# Patient Record
Sex: MALE | Race: WHITE | NOT HISPANIC OR LATINO
[De-identification: names, ages, dates, MRNs, and addresses within clinical notes are randomized per-mention and may not be internally consistent; named-entity substitution may affect disease eponyms.]

---

## 2017-10-07 ENCOUNTER — APPOINTMENT (OUTPATIENT)
Dept: FAMILY MEDICINE | Facility: CLINIC | Age: 77
End: 2017-10-07
Payer: MEDICARE

## 2017-10-07 VITALS
SYSTOLIC BLOOD PRESSURE: 120 MMHG | WEIGHT: 161 LBS | RESPIRATION RATE: 12 BRPM | HEART RATE: 64 BPM | OXYGEN SATURATION: 98 % | HEIGHT: 65 IN | BODY MASS INDEX: 26.82 KG/M2 | DIASTOLIC BLOOD PRESSURE: 80 MMHG

## 2017-10-07 VITALS — TEMPERATURE: 98.8 F

## 2017-10-07 DIAGNOSIS — Z80.0 FAMILY HISTORY OF MALIGNANT NEOPLASM OF DIGESTIVE ORGANS: ICD-10-CM

## 2017-10-07 DIAGNOSIS — Z82.49 FAMILY HISTORY OF ISCHEMIC HEART DISEASE AND OTHER DISEASES OF THE CIRCULATORY SYSTEM: ICD-10-CM

## 2017-10-07 DIAGNOSIS — Z78.9 OTHER SPECIFIED HEALTH STATUS: ICD-10-CM

## 2017-10-07 DIAGNOSIS — Z85.01 PERSONAL HISTORY OF MALIGNANT NEOPLASM OF ESOPHAGUS: ICD-10-CM

## 2017-10-07 PROCEDURE — 99213 OFFICE O/P EST LOW 20 MIN: CPT

## 2017-10-10 ENCOUNTER — RX RENEWAL (OUTPATIENT)
Age: 77
End: 2017-10-10

## 2018-04-05 ENCOUNTER — APPOINTMENT (OUTPATIENT)
Dept: FAMILY MEDICINE | Facility: CLINIC | Age: 78
End: 2018-04-05
Payer: MEDICARE

## 2018-04-05 ENCOUNTER — NON-APPOINTMENT (OUTPATIENT)
Age: 78
End: 2018-04-05

## 2018-04-05 VITALS
RESPIRATION RATE: 12 BRPM | OXYGEN SATURATION: 97 % | SYSTOLIC BLOOD PRESSURE: 120 MMHG | HEART RATE: 70 BPM | BODY MASS INDEX: 27.32 KG/M2 | DIASTOLIC BLOOD PRESSURE: 70 MMHG | WEIGHT: 164 LBS | HEIGHT: 65 IN

## 2018-04-05 PROCEDURE — 90670 PCV13 VACCINE IM: CPT

## 2018-04-05 PROCEDURE — 93000 ELECTROCARDIOGRAM COMPLETE: CPT

## 2018-04-05 PROCEDURE — G0009: CPT

## 2018-04-05 PROCEDURE — G0439: CPT

## 2018-04-05 RX ORDER — BENZONATATE 100 MG/1
100 CAPSULE ORAL 3 TIMES DAILY
Qty: 75 | Refills: 2 | Status: DISCONTINUED | COMMUNITY
Start: 2017-10-10 | End: 2018-04-05

## 2018-04-06 LAB
25(OH)D3 SERPL-MCNC: 22.4 NG/ML
ALBUMIN SERPL ELPH-MCNC: 4.2 G/DL
ALP BLD-CCNC: 111 U/L
ALT SERPL-CCNC: 14 U/L
ANION GAP SERPL CALC-SCNC: 12 MMOL/L
AST SERPL-CCNC: 26 U/L
BASOPHILS # BLD AUTO: 0.03 K/UL
BASOPHILS NFR BLD AUTO: 0.6 %
BILIRUB SERPL-MCNC: 0.3 MG/DL
BUN SERPL-MCNC: 23 MG/DL
CALCIUM SERPL-MCNC: 9.6 MG/DL
CHLORIDE SERPL-SCNC: 105 MMOL/L
CHOLEST SERPL-MCNC: 198 MG/DL
CHOLEST/HDLC SERPL: 3.3 RATIO
CO2 SERPL-SCNC: 25 MMOL/L
CREAT SERPL-MCNC: 1.32 MG/DL
EOSINOPHIL # BLD AUTO: 0.16 K/UL
EOSINOPHIL NFR BLD AUTO: 3.4 %
GLUCOSE SERPL-MCNC: 99 MG/DL
HBA1C MFR BLD HPLC: 6 %
HCT VFR BLD CALC: 37.7 %
HDLC SERPL-MCNC: 60 MG/DL
HGB BLD-MCNC: 12.1 G/DL
IMM GRANULOCYTES NFR BLD AUTO: 0.2 %
IRON SERPL-MCNC: 51 UG/DL
LDLC SERPL CALC-MCNC: 119 MG/DL
LYMPHOCYTES # BLD AUTO: 0.86 K/UL
LYMPHOCYTES NFR BLD AUTO: 18.3 %
MAN DIFF?: NORMAL
MCHC RBC-ENTMCNC: 28.4 PG
MCHC RBC-ENTMCNC: 32.1 GM/DL
MCV RBC AUTO: 88.5 FL
MONOCYTES # BLD AUTO: 0.51 K/UL
MONOCYTES NFR BLD AUTO: 10.8 %
NEUTROPHILS # BLD AUTO: 3.14 K/UL
NEUTROPHILS NFR BLD AUTO: 66.7 %
PLATELET # BLD AUTO: 210 K/UL
POTASSIUM SERPL-SCNC: 4.9 MMOL/L
PROT SERPL-MCNC: 7.7 G/DL
RBC # BLD: 4.26 M/UL
RBC # FLD: 13.6 %
SODIUM SERPL-SCNC: 142 MMOL/L
T3 SERPL-MCNC: 103 NG/DL
T3FREE SERPL-MCNC: 2.7 PG/ML
T4 FREE SERPL-MCNC: 1.1 NG/DL
T4 SERPL-MCNC: 6.2 UG/DL
TRIGL SERPL-MCNC: 95 MG/DL
TSH SERPL-ACNC: 1.88 UIU/ML
VIT B12 SERPL-MCNC: 370 PG/ML
WBC # FLD AUTO: 4.71 K/UL

## 2018-04-26 DIAGNOSIS — E55.9 VITAMIN D DEFICIENCY, UNSPECIFIED: ICD-10-CM

## 2018-04-26 DIAGNOSIS — R53.83 OTHER FATIGUE: ICD-10-CM

## 2018-05-07 ENCOUNTER — APPOINTMENT (OUTPATIENT)
Dept: FAMILY MEDICINE | Facility: CLINIC | Age: 78
End: 2018-05-07
Payer: MEDICARE

## 2018-05-07 VITALS
OXYGEN SATURATION: 97 % | BODY MASS INDEX: 27.32 KG/M2 | DIASTOLIC BLOOD PRESSURE: 64 MMHG | RESPIRATION RATE: 14 BRPM | HEART RATE: 71 BPM | WEIGHT: 164 LBS | HEIGHT: 65 IN | SYSTOLIC BLOOD PRESSURE: 106 MMHG

## 2018-05-07 PROCEDURE — 99213 OFFICE O/P EST LOW 20 MIN: CPT

## 2018-05-10 ENCOUNTER — RX RENEWAL (OUTPATIENT)
Age: 78
End: 2018-05-10

## 2018-07-23 PROBLEM — Z80.0 FAMILY HISTORY OF COLON CANCER: Status: ACTIVE | Noted: 2017-10-07

## 2018-08-03 ENCOUNTER — APPOINTMENT (OUTPATIENT)
Dept: FAMILY MEDICINE | Facility: CLINIC | Age: 78
End: 2018-08-03

## 2018-08-10 ENCOUNTER — RX RENEWAL (OUTPATIENT)
Age: 78
End: 2018-08-10

## 2018-08-10 RX ORDER — BLOOD SUGAR DIAGNOSTIC
STRIP MISCELLANEOUS
Qty: 1 | Refills: 3 | Status: ACTIVE | COMMUNITY
Start: 2018-08-10 | End: 1900-01-01

## 2018-08-29 ENCOUNTER — APPOINTMENT (OUTPATIENT)
Dept: FAMILY MEDICINE | Facility: CLINIC | Age: 78
End: 2018-08-29
Payer: MEDICARE

## 2018-08-29 VITALS
BODY MASS INDEX: 27.32 KG/M2 | WEIGHT: 164 LBS | OXYGEN SATURATION: 97 % | HEART RATE: 82 BPM | RESPIRATION RATE: 14 BRPM | DIASTOLIC BLOOD PRESSURE: 64 MMHG | HEIGHT: 65 IN | SYSTOLIC BLOOD PRESSURE: 114 MMHG

## 2018-08-29 DIAGNOSIS — R97.20 ELEVATED PROSTATE, SPECIFIC ANTIGEN [PSA]: ICD-10-CM

## 2018-08-29 PROCEDURE — 99213 OFFICE O/P EST LOW 20 MIN: CPT

## 2018-08-29 NOTE — PHYSICAL EXAM
[No Acute Distress] : no acute distress [Well Nourished] : well nourished [Well Developed] : well developed [Well-Appearing] : well-appearing [No JVD] : no jugular venous distention [Supple] : supple [No Lymphadenopathy] : no lymphadenopathy [No Respiratory Distress] : no respiratory distress  [Clear to Auscultation] : lungs were clear to auscultation bilaterally [Normal Rate] : normal rate  [Regular Rhythm] : with a regular rhythm [No Murmur] : no murmur heard [No Carotid Bruits] : no carotid bruits [Normal Posterior Cervical Nodes] : no posterior cervical lymphadenopathy [Normal Anterior Cervical Nodes] : no anterior cervical lymphadenopathy [No CVA Tenderness] : no CVA  tenderness [No Spinal Tenderness] : no spinal tenderness

## 2018-08-29 NOTE — HISTORY OF PRESENT ILLNESS
[FreeTextEntry8] : 3 month check  Doing well with GERD. Due for check on hyperlipidemia and borderline diabetes.

## 2018-08-29 NOTE — ASSESSMENT
[FreeTextEntry1] : 1. Pt doing well on treatment as long as he behaves himself with his diet/wine. 2. Diet reviewed and due for lab work. Reviewed PSA's from urology with him - at present observe. 4.  Lab check on tick borne diseases

## 2018-09-10 LAB — HBA1C MFR BLD HPLC: 6.3

## 2018-10-02 ENCOUNTER — APPOINTMENT (OUTPATIENT)
Dept: FAMILY MEDICINE | Facility: CLINIC | Age: 78
End: 2018-10-02

## 2018-12-18 ENCOUNTER — RX RENEWAL (OUTPATIENT)
Age: 78
End: 2018-12-18

## 2019-05-02 ENCOUNTER — APPOINTMENT (OUTPATIENT)
Dept: FAMILY MEDICINE | Facility: CLINIC | Age: 79
End: 2019-05-02
Payer: MEDICARE

## 2019-05-02 VITALS
RESPIRATION RATE: 14 BRPM | HEIGHT: 65 IN | BODY MASS INDEX: 26.82 KG/M2 | WEIGHT: 161 LBS | OXYGEN SATURATION: 96 % | SYSTOLIC BLOOD PRESSURE: 118 MMHG | DIASTOLIC BLOOD PRESSURE: 80 MMHG | HEART RATE: 65 BPM

## 2019-05-02 DIAGNOSIS — Z12.11 ENCOUNTER FOR SCREENING FOR MALIGNANT NEOPLASM OF COLON: ICD-10-CM

## 2019-05-02 DIAGNOSIS — Z13.1 ENCOUNTER FOR SCREENING FOR DIABETES MELLITUS: ICD-10-CM

## 2019-05-02 DIAGNOSIS — Z13.21 ENCOUNTER FOR SCREENING FOR NUTRITIONAL DISORDER: ICD-10-CM

## 2019-05-02 DIAGNOSIS — Z13.29 ENCOUNTER FOR SCREENING FOR OTHER SUSPECTED ENDOCRINE DISORDER: ICD-10-CM

## 2019-05-02 DIAGNOSIS — Z13.220 ENCOUNTER FOR SCREENING FOR LIPOID DISORDERS: ICD-10-CM

## 2019-05-02 PROCEDURE — 36415 COLL VENOUS BLD VENIPUNCTURE: CPT

## 2019-05-02 PROCEDURE — G0439: CPT

## 2019-05-02 PROCEDURE — 90732 PPSV23 VACC 2 YRS+ SUBQ/IM: CPT

## 2019-05-02 PROCEDURE — G0009: CPT

## 2019-05-02 RX ORDER — DOXYCYCLINE HYCLATE 100 MG/1
100 CAPSULE ORAL
Qty: 10 | Refills: 0 | Status: DISCONTINUED | COMMUNITY
Start: 2018-05-07 | End: 2019-05-02

## 2019-05-02 NOTE — ASSESSMENT
[FreeTextEntry1] : Diet/exercise reviewed.  Fall precautions reviewed.  FOB kit given.  Pneumovax 23 given.  Lab done here today. Meds were reviewed.

## 2019-05-02 NOTE — PHYSICAL EXAM
[No Acute Distress] : no acute distress [Well Nourished] : well nourished [Well Developed] : well developed [Well-Appearing] : well-appearing [PERRL] : pupils equal round and reactive to light [Normal Sclera/Conjunctiva] : normal sclera/conjunctiva [Normal Outer Ear/Nose] : the outer ears and nose were normal in appearance [EOMI] : extraocular movements intact [No JVD] : no jugular venous distention [Normal Oropharynx] : the oropharynx was normal [Supple] : supple [No Respiratory Distress] : no respiratory distress  [Thyroid Normal, No Nodules] : the thyroid was normal and there were no nodules present [No Lymphadenopathy] : no lymphadenopathy [Clear to Auscultation] : lungs were clear to auscultation bilaterally [Normal Rate] : normal rate  [Regular Rhythm] : with a regular rhythm [No Murmur] : no murmur heard [Normal S1, S2] : normal S1 and S2 [No Carotid Bruits] : no carotid bruits [No Abdominal Bruit] : a ~M bruit was not heard ~T in the abdomen [No Varicosities] : no varicosities [Pedal Pulses Present] : the pedal pulses are present [No Extremity Clubbing/Cyanosis] : no extremity clubbing/cyanosis [No Edema] : there was no peripheral edema [No Palpable Aorta] : no palpable aorta [Soft] : abdomen soft [Non Tender] : non-tender [Non-distended] : non-distended [No Masses] : no abdominal mass palpated [No HSM] : no HSM [Normal Bowel Sounds] : normal bowel sounds [Normal Posterior Cervical Nodes] : no posterior cervical lymphadenopathy [Normal Anterior Cervical Nodes] : no anterior cervical lymphadenopathy [No CVA Tenderness] : no CVA  tenderness [No Spinal Tenderness] : no spinal tenderness [No Joint Swelling] : no joint swelling [Grossly Normal Strength/Tone] : grossly normal strength/tone [No Rash] : no rash [Normal Gait] : normal gait [Coordination Grossly Intact] : coordination grossly intact [No Focal Deficits] : no focal deficits [Deep Tendon Reflexes (DTR)] : deep tendon reflexes were 2+ and symmetric [Speech Grossly Normal] : speech grossly normal [Memory Grossly Normal] : memory grossly normal [Normal Affect] : the affect was normal [Alert and Oriented x3] : oriented to person, place, and time [Normal Mood] : the mood was normal [Normal Insight/Judgement] : insight and judgment were intact

## 2019-05-02 NOTE — HEALTH RISK ASSESSMENT
[Very Good] : ~his/her~  mood as very good [No falls in past year] : Patient reported no falls in the past year [0] : 1) Little interest or pleasure doing things: Not at all (0) [Hepatitis C test declined] : Hepatitis C test declined [HIV test declined] : HIV test declined [Patient reported colonoscopy was normal] : Patient reported colonoscopy was normal [None] : None [With Significant Other] : lives with significant other [] :  [Employed] : employed [Feels Safe at Home] : Feels safe at home [# Of Children ___] : has [unfilled] children [Fully functional (bathing, dressing, toileting, transferring, walking, feeding)] : Fully functional (bathing, dressing, toileting, transferring, walking, feeding) [Reports changes in hearing] : Reports changes in hearing [Fully functional (using the telephone, shopping, preparing meals, housekeeping, doing laundry, using] : Fully functional and needs no help or supervision to perform IADLs (using the telephone, shopping, preparing meals, housekeeping, doing laundry, using transportation, managing medications and managing finances) [Smoke Detector] : smoke detector [Reports changes in vision] : Reports changes in vision [Carbon Monoxide Detector] : carbon monoxide detector [Sunscreen] : uses sunscreen [Seat Belt] :  uses seat belt [Safety elements used in home] : safety elements used in home [Designated Healthcare Proxy] : Designated healthcare proxy [Relationship: ___] : Relationship: [unfilled] [Name: ___] : Health Care Proxy's Name: [unfilled]  [DNR] : DNR [] : No [de-identified] : social use [de-identified] : no exercise, does a physical job  twice a week [de-identified] : low salt [[id number]] : 0 [Change in mental status noted] : No change in mental status noted [Language] : denies difficulty with language [Behavior] : denies difficulty with behavior [Learning/Retaining New Information] : denies difficulty learning/retaining new information [Reasoning] : denies difficulty with reasoning [Handling Complex Tasks] : denies difficulty handling complex tasks [Guns at Home] : no guns at home [Reports changes in dental health] : Reports no changes in dental health [Travel to Developing Areas] : does not  travel to developing areas [TB Exposure] : is not being exposed to tuberculosis [ColonoscopyDate] : 2016 [ColonoscopyComments] : 3-5 years  Dr Dougherty [AdvancecareDate] : 5/2/2019

## 2019-05-04 LAB
25(OH)D3 SERPL-MCNC: 27 NG/ML
ALBUMIN SERPL ELPH-MCNC: 4.3 G/DL
ALP BLD-CCNC: 94 U/L
ALT SERPL-CCNC: 8 U/L
ANION GAP SERPL CALC-SCNC: 11 MMOL/L
AST SERPL-CCNC: 18 U/L
BASOPHILS # BLD AUTO: 0.03 K/UL
BASOPHILS NFR BLD AUTO: 0.7 %
BILIRUB SERPL-MCNC: 0.5 MG/DL
BUN SERPL-MCNC: 21 MG/DL
CALCIUM SERPL-MCNC: 9.9 MG/DL
CHLORIDE SERPL-SCNC: 104 MMOL/L
CHOLEST SERPL-MCNC: 188 MG/DL
CHOLEST/HDLC SERPL: 3.3 RATIO
CO2 SERPL-SCNC: 27 MMOL/L
CREAT SERPL-MCNC: 1.34 MG/DL
EOSINOPHIL # BLD AUTO: 0.04 K/UL
EOSINOPHIL NFR BLD AUTO: 1 %
ESTIMATED AVERAGE GLUCOSE: 134 MG/DL
FOLATE SERPL-MCNC: 13.7 NG/ML
GLUCOSE SERPL-MCNC: 103 MG/DL
HBA1C MFR BLD HPLC: 6.3 %
HCT VFR BLD CALC: 41 %
HDLC SERPL-MCNC: 57 MG/DL
HGB BLD-MCNC: 13 G/DL
IMM GRANULOCYTES NFR BLD AUTO: 0.5 %
LDLC SERPL CALC-MCNC: 114 MG/DL
LYMPHOCYTES # BLD AUTO: 0.62 K/UL
LYMPHOCYTES NFR BLD AUTO: 15.4 %
MAN DIFF?: NORMAL
MCHC RBC-ENTMCNC: 28.4 PG
MCHC RBC-ENTMCNC: 31.7 GM/DL
MCV RBC AUTO: 89.5 FL
MONOCYTES # BLD AUTO: 0.42 K/UL
MONOCYTES NFR BLD AUTO: 10.4 %
NEUTROPHILS # BLD AUTO: 2.9 K/UL
NEUTROPHILS NFR BLD AUTO: 72 %
PLATELET # BLD AUTO: 168 K/UL
POTASSIUM SERPL-SCNC: 4.7 MMOL/L
PROT SERPL-MCNC: 6.8 G/DL
RBC # BLD: 4.58 M/UL
RBC # FLD: 13.2 %
SODIUM SERPL-SCNC: 142 MMOL/L
T3FREE SERPL-MCNC: 2.62 PG/ML
T4 FREE SERPL-MCNC: 1.1 NG/DL
TRIGL SERPL-MCNC: 86 MG/DL
TSH SERPL-ACNC: 2.3 UIU/ML
VIT B12 SERPL-MCNC: 345 PG/ML
WBC # FLD AUTO: 4.03 K/UL

## 2019-05-16 ENCOUNTER — APPOINTMENT (OUTPATIENT)
Dept: FAMILY MEDICINE | Facility: CLINIC | Age: 79
End: 2019-05-16
Payer: MEDICARE

## 2019-05-16 VITALS
DIASTOLIC BLOOD PRESSURE: 70 MMHG | RESPIRATION RATE: 14 BRPM | TEMPERATURE: 101.3 F | BODY MASS INDEX: 26.82 KG/M2 | HEART RATE: 92 BPM | OXYGEN SATURATION: 96 % | WEIGHT: 161 LBS | HEIGHT: 65 IN | SYSTOLIC BLOOD PRESSURE: 120 MMHG

## 2019-05-16 DIAGNOSIS — R50.9 FEVER, UNSPECIFIED: ICD-10-CM

## 2019-05-16 PROCEDURE — 99213 OFFICE O/P EST LOW 20 MIN: CPT

## 2019-05-16 NOTE — REVIEW OF SYSTEMS
[Fever] : fever [Postnasal Drip] : postnasal drip [Cough] : cough [Nasal Discharge] : nasal discharge [Negative] : Heme/Lymph [FreeTextEntry4] : sinus pressure

## 2019-05-16 NOTE — ASSESSMENT
[FreeTextEntry1] : Rest, fluids, flonase, vitamin C, benzonate prn cough\par     - check CXR rule out pneumonia \par     - patient instructed to RTO if symptoms worsen or persist, if fevers develop, does not get better in 7 days.\par    - start doxy, take with food avoid taking with dairy/iron products.

## 2019-05-16 NOTE — HISTORY OF PRESENT ILLNESS
[___ Days ago] : [unfilled] days ago [FreeTextEntry8] : Pt reports +temp, +congestion, +cough with green phlegm +sinus pressure, +RN/PND x 5 days. Drinking lots of water and taking Tylenol for fever. Denies shortness of breath, headache, ear pain, sore throat. States wife is sick with a URI.

## 2019-05-16 NOTE — PHYSICAL EXAM
[No Acute Distress] : no acute distress [Well Nourished] : well nourished [Well Developed] : well developed [Ill-Appearing] : ill-appearing [Normal Sclera/Conjunctiva] : normal sclera/conjunctiva [Normal Outer Ear/Nose] : the outer ears and nose were normal in appearance [No Lymphadenopathy] : no lymphadenopathy [Supple] : supple [No Respiratory Distress] : no respiratory distress  [Normal Rate] : normal rate  [No Accessory Muscle Use] : no accessory muscle use [Normal S1, S2] : normal S1 and S2 [Regular Rhythm] : with a regular rhythm [Normal Affect] : the affect was normal [No Murmur] : no murmur heard [Alert and Oriented x3] : oriented to person, place, and time [Normal Insight/Judgement] : insight and judgment were intact [de-identified] : bilateral nasal turbinates and posterior oropharynx erythematous, moderate clear post nasal drip, +ethmoid and sinsu pressure, bilateral TM serous effusion.  [de-identified] : ELIJAH rhonchi heard on auscultation, breath sounds otherwise clear

## 2019-05-28 ENCOUNTER — APPOINTMENT (OUTPATIENT)
Dept: FAMILY MEDICINE | Facility: CLINIC | Age: 79
End: 2019-05-28
Payer: MEDICARE

## 2019-05-28 VITALS
OXYGEN SATURATION: 97 % | HEART RATE: 80 BPM | RESPIRATION RATE: 14 BRPM | SYSTOLIC BLOOD PRESSURE: 124 MMHG | BODY MASS INDEX: 25.96 KG/M2 | DIASTOLIC BLOOD PRESSURE: 70 MMHG | WEIGHT: 156 LBS

## 2019-05-28 DIAGNOSIS — F41.8 OTHER SPECIFIED ANXIETY DISORDERS: ICD-10-CM

## 2019-05-28 PROCEDURE — 99213 OFFICE O/P EST LOW 20 MIN: CPT

## 2019-05-28 NOTE — ASSESSMENT
[FreeTextEntry1] : We discussed things that have been happening and what happened in the  past.  Rx of lorazepam 0.5 mg 1/2 to 1 tid prn. Use/precautions/safety of medication all reviewed.  [place]   referral to a counselor. He will call me with any concerns. \par

## 2019-05-28 NOTE — HISTORY OF PRESENT ILLNESS
[FreeTextEntry8] : pt c/o feeling off- anxious about several things.   Several friends , wife sick.  He will be going back to work on Monday.  " I really hate when I can't work".  I had this issue in the past and  ativan for a short period of time really helped.

## 2019-05-28 NOTE — PHYSICAL EXAM
[No Acute Distress] : no acute distress [Well Nourished] : well nourished [Well Developed] : well developed [Well-Appearing] : well-appearing [No JVD] : no jugular venous distention [No Lymphadenopathy] : no lymphadenopathy [Supple] : supple [No Respiratory Distress] : no respiratory distress  [Clear to Auscultation] : lungs were clear to auscultation bilaterally [Normal Rate] : normal rate  [Regular Rhythm] : with a regular rhythm [No Carotid Bruits] : no carotid bruits [Normal Posterior Cervical Nodes] : no posterior cervical lymphadenopathy [No Murmur] : no murmur heard [Speech Grossly Normal] : speech grossly normal [Normal Anterior Cervical Nodes] : no anterior cervical lymphadenopathy [Alert and Oriented x3] : oriented to person, place, and time [Memory Grossly Normal] : memory grossly normal [Normal Insight/Judgement] : insight and judgment were intact [de-identified] : flat affect

## 2019-06-11 DIAGNOSIS — R69 ILLNESS, UNSPECIFIED: ICD-10-CM

## 2019-06-14 ENCOUNTER — RX RENEWAL (OUTPATIENT)
Age: 79
End: 2019-06-14

## 2019-10-29 ENCOUNTER — RX CHANGE (OUTPATIENT)
Age: 79
End: 2019-10-29

## 2020-01-10 ENCOUNTER — APPOINTMENT (OUTPATIENT)
Dept: FAMILY MEDICINE | Facility: CLINIC | Age: 80
End: 2020-01-10
Payer: MEDICARE

## 2020-01-10 VITALS
WEIGHT: 162 LBS | BODY MASS INDEX: 26.99 KG/M2 | SYSTOLIC BLOOD PRESSURE: 122 MMHG | TEMPERATURE: 98.7 F | OXYGEN SATURATION: 98 % | HEART RATE: 72 BPM | HEIGHT: 65 IN | RESPIRATION RATE: 14 BRPM | DIASTOLIC BLOOD PRESSURE: 70 MMHG

## 2020-01-10 PROCEDURE — 90653 IIV ADJUVANT VACCINE IM: CPT

## 2020-01-10 PROCEDURE — G0008: CPT

## 2020-01-10 PROCEDURE — 36415 COLL VENOUS BLD VENIPUNCTURE: CPT

## 2020-01-10 PROCEDURE — 99213 OFFICE O/P EST LOW 20 MIN: CPT | Mod: 25

## 2020-01-10 RX ORDER — BENZONATATE 100 MG/1
100 CAPSULE ORAL 3 TIMES DAILY
Qty: 30 | Refills: 1 | Status: DISCONTINUED | COMMUNITY
Start: 2019-05-16 | End: 2020-01-10

## 2020-01-10 RX ORDER — DIAZEPAM 5 MG/1
5 TABLET ORAL
Qty: 1 | Refills: 0 | Status: DISCONTINUED | COMMUNITY
Start: 2018-03-14 | End: 2020-01-10

## 2020-01-10 RX ORDER — CEFDINIR 300 MG/1
300 CAPSULE ORAL
Qty: 14 | Refills: 0 | Status: DISCONTINUED | COMMUNITY
Start: 2017-10-07 | End: 2020-01-10

## 2020-01-10 NOTE — REVIEW OF SYSTEMS
[Joint Stiffness] : joint stiffness [Joint Pain] : joint pain [Heartburn] : heartburn [Anxiety] : anxiety [Negative] : Neurological [Depression] : no depression

## 2020-01-10 NOTE — HISTORY OF PRESENT ILLNESS
[FreeTextEntry8] : pt c/o +chills, +post nasal drip, +phlegm +cough X 1 week   Feeling better now  and needs lab and flu shot.

## 2020-01-10 NOTE — PHYSICAL EXAM
[No Carotid Bruits] : no carotid bruits [No Edema] : there was no peripheral edema [Normal Posterior Cervical Nodes] : no posterior cervical lymphadenopathy [Normal Anterior Cervical Nodes] : no anterior cervical lymphadenopathy [Normal] : affect was normal and insight and judgment were intact

## 2020-01-10 NOTE — ASSESSMENT
[FreeTextEntry1] : 1. GERD- we reviewed his use of  omeprazole and he needs to use it daily.  2.  Diet reviewed and  lab done to check status.  3.  Fluad given.

## 2020-01-13 LAB
ESTIMATED AVERAGE GLUCOSE: 137 MG/DL
HBA1C MFR BLD HPLC: 6.4 %
PSA SERPL-MCNC: 6.45
PSA SERPL-MCNC: 7.43 NG/ML
TSH SERPL-ACNC: 2.92 UIU/ML

## 2020-07-20 ENCOUNTER — APPOINTMENT (OUTPATIENT)
Dept: FAMILY MEDICINE | Facility: CLINIC | Age: 80
End: 2020-07-20
Payer: MEDICARE

## 2020-07-20 VITALS
DIASTOLIC BLOOD PRESSURE: 70 MMHG | RESPIRATION RATE: 14 BRPM | HEART RATE: 70 BPM | OXYGEN SATURATION: 97 % | WEIGHT: 157 LBS | BODY MASS INDEX: 26.16 KG/M2 | TEMPERATURE: 99.1 F | HEIGHT: 65 IN | SYSTOLIC BLOOD PRESSURE: 120 MMHG

## 2020-07-20 DIAGNOSIS — Z00.00 ENCOUNTER FOR GENERAL ADULT MEDICAL EXAMINATION W/OUT ABNORMAL FINDINGS: ICD-10-CM

## 2020-07-20 DIAGNOSIS — Z11.59 ENCOUNTER FOR SCREENING FOR OTHER VIRAL DISEASES: ICD-10-CM

## 2020-07-20 PROCEDURE — G0439: CPT

## 2020-07-20 PROCEDURE — 36415 COLL VENOUS BLD VENIPUNCTURE: CPT

## 2020-07-20 RX ORDER — LORAZEPAM 0.5 MG/1
0.5 TABLET ORAL
Qty: 75 | Refills: 0 | Status: DISCONTINUED | COMMUNITY
Start: 2019-05-28 | End: 2020-07-20

## 2020-07-20 NOTE — HEALTH RISK ASSESSMENT
[Very Good] : ~his/her~ current health as very good [Excellent] : ~his/her~  mood as  excellent [Yes] : Yes [Monthly or less (1 pt)] : Monthly or less (1 point) [1 or 2 (0 pts)] : 1 or 2 (0 points) [Never (0 pts)] : Never (0 points) [No] : In the past 12 months have you used drugs other than those required for medical reasons? No [No falls in past year] : Patient reported no falls in the past year [0] : 1) Little interest or pleasure doing things: Not at all (0) [HIV test declined] : HIV test declined [Hepatitis C test declined] : Hepatitis C test declined [None] : None [With Significant Other] : lives with significant other [] :  [High School] : high school [Retired] : retired [# Of Children ___] : has [unfilled] children [Feels Safe at Home] : Feels safe at home [Fully functional (bathing, dressing, toileting, transferring, walking, feeding)] : Fully functional (bathing, dressing, toileting, transferring, walking, feeding) [Fully functional (using the telephone, shopping, preparing meals, housekeeping, doing laundry, using] : Fully functional and needs no help or supervision to perform IADLs (using the telephone, shopping, preparing meals, housekeeping, doing laundry, using transportation, managing medications and managing finances) [Reports changes in hearing] : Reports changes in hearing [Reports normal functional visual acuity (ie: able to read med bottle)] : Reports normal functional visual acuity [Smoke Detector] : smoke detector [Carbon Monoxide Detector] : carbon monoxide detector [Safety elements used in home] : safety elements used in home [Seat Belt] :  uses seat belt [Sunscreen] : uses sunscreen [Designated Healthcare Proxy] : Designated healthcare proxy [Name: ___] : Health Care Proxy's Name: [unfilled]  [Relationship: ___] : Relationship: [unfilled] [Audit-CScore] : 1 [] : No [de-identified] : very active [de-identified] : no special diet [[id number]] : 0 [Change in mental status noted] : No change in mental status noted [Language] : denies difficulty with language [Behavior] : denies difficulty with behavior [Learning/Retaining New Information] : denies difficulty learning/retaining new information [Handling Complex Tasks] : denies difficulty handling complex tasks [Reasoning] : denies difficulty with reasoning [Spatial Ability and Orientation] : denies difficulty with spatial ability and orientation [Reports changes in vision] : Reports no changes in vision [Reports changes in dental health] : Reports no changes in dental health [Guns at Home] : no guns at home [Travel to Developing Areas] : does not  travel to developing areas [TB Exposure] : is not being exposed to tuberculosis [ColonoscopyDate] : 2016 [AdvancecareDate] : 7/20/2020

## 2020-07-20 NOTE — ASSESSMENT
[FreeTextEntry1] : Diet/ activity / medications/ fall and COVID19 precautions reviewed.   Meds are UTD   Lab done here today.

## 2020-07-20 NOTE — REVIEW OF SYSTEMS
[Heartburn] : heartburn [Joint Stiffness] : joint stiffness [Joint Pain] : joint pain [Anxiety] : no anxiety [Depression] : no depression [Negative] : Heme/Lymph

## 2020-07-21 LAB
ALBUMIN SERPL ELPH-MCNC: 4.6 G/DL
ALP BLD-CCNC: 95 U/L
ALT SERPL-CCNC: 9 U/L
ANION GAP SERPL CALC-SCNC: 13 MMOL/L
AST SERPL-CCNC: 21 U/L
BASOPHILS # BLD AUTO: 0.04 K/UL
BASOPHILS NFR BLD AUTO: 0.8 %
BILIRUB SERPL-MCNC: 0.4 MG/DL
BUN SERPL-MCNC: 23 MG/DL
CALCIUM SERPL-MCNC: 9.8 MG/DL
CHLORIDE SERPL-SCNC: 104 MMOL/L
CHOLEST SERPL-MCNC: 203 MG/DL
CHOLEST/HDLC SERPL: 3.6 RATIO
CK SERPL-CCNC: 109 U/L
CO2 SERPL-SCNC: 25 MMOL/L
CREAT SERPL-MCNC: 1.5 MG/DL
EOSINOPHIL # BLD AUTO: 0.07 K/UL
EOSINOPHIL NFR BLD AUTO: 1.5 %
ESTIMATED AVERAGE GLUCOSE: 128 MG/DL
GLUCOSE SERPL-MCNC: 109 MG/DL
HBA1C MFR BLD HPLC: 6.1 %
HCT VFR BLD CALC: 41.5 %
HDLC SERPL-MCNC: 57 MG/DL
HGB BLD-MCNC: 12.3 G/DL
IMM GRANULOCYTES NFR BLD AUTO: 0.2 %
LDLC SERPL CALC-MCNC: 121 MG/DL
LYMPHOCYTES # BLD AUTO: 0.76 K/UL
LYMPHOCYTES NFR BLD AUTO: 16.1 %
MAN DIFF?: NORMAL
MCHC RBC-ENTMCNC: 27 PG
MCHC RBC-ENTMCNC: 29.6 GM/DL
MCV RBC AUTO: 91.2 FL
MONOCYTES # BLD AUTO: 0.5 K/UL
MONOCYTES NFR BLD AUTO: 10.6 %
NEUTROPHILS # BLD AUTO: 3.33 K/UL
NEUTROPHILS NFR BLD AUTO: 70.8 %
PLATELET # BLD AUTO: 189 K/UL
POTASSIUM SERPL-SCNC: 4.7 MMOL/L
PROT SERPL-MCNC: 7.7 G/DL
PSA SERPL-MCNC: 7.62 NG/ML
RBC # BLD: 4.55 M/UL
RBC # FLD: 14.8 %
SARS-COV-2 IGG SERPL IA-ACNC: 0.13 INDEX
SARS-COV-2 IGG SERPL QL IA: NEGATIVE
SODIUM SERPL-SCNC: 142 MMOL/L
T3FREE SERPL-MCNC: 2.29 PG/ML
T4 FREE SERPL-MCNC: 1 NG/DL
TRIGL SERPL-MCNC: 128 MG/DL
TSH SERPL-ACNC: 2.45 UIU/ML
WBC # FLD AUTO: 4.71 K/UL

## 2020-09-28 ENCOUNTER — APPOINTMENT (OUTPATIENT)
Dept: FAMILY MEDICINE | Facility: CLINIC | Age: 80
End: 2020-09-28
Payer: MEDICARE

## 2020-09-28 PROCEDURE — 99441: CPT | Mod: 95

## 2020-09-28 RX ORDER — FLUTICASONE PROPIONATE 50 UG/1
50 SPRAY, METERED NASAL
Qty: 1 | Refills: 0 | Status: COMPLETED | COMMUNITY
Start: 2019-05-16 | End: 2020-09-28

## 2020-09-28 RX ORDER — DOXYCYCLINE HYCLATE 100 MG/1
100 CAPSULE ORAL
Qty: 14 | Refills: 0 | Status: COMPLETED | COMMUNITY
Start: 2019-05-16 | End: 2020-09-28

## 2020-09-28 NOTE — HISTORY OF PRESENT ILLNESS
[Home] : at home, [unfilled] , at the time of the visit. [Medical Office: ([facility])___] : at the medical office located in  [Verbal consent obtained from patient] : the patient, [unfilled] [FreeTextEntry1] : follow up  [de-identified] : Patient presents for follow up/ medication renewal. Patient requesting omeprazole. He reports occasional heartburn symptoms. He has not been eating as well due to the pandemic. States omeprazole is helpful to his symptoms. He has no other acute complaints.

## 2020-09-28 NOTE — PHYSICAL EXAM
[No Acute Distress] : no acute distress [Normal] : affect was normal and insight and judgment were intact

## 2020-11-05 ENCOUNTER — APPOINTMENT (OUTPATIENT)
Dept: FAMILY MEDICINE | Facility: CLINIC | Age: 80
End: 2020-11-05
Payer: MEDICARE

## 2020-11-05 VITALS — TEMPERATURE: 98.2 F

## 2020-11-05 PROCEDURE — 90662 IIV NO PRSV INCREASED AG IM: CPT

## 2020-11-05 PROCEDURE — G0008: CPT

## 2021-03-09 ENCOUNTER — APPOINTMENT (OUTPATIENT)
Dept: FAMILY MEDICINE | Facility: CLINIC | Age: 81
End: 2021-03-09
Payer: MEDICARE

## 2021-03-09 VITALS
SYSTOLIC BLOOD PRESSURE: 122 MMHG | HEART RATE: 87 BPM | RESPIRATION RATE: 14 BRPM | HEIGHT: 65 IN | WEIGHT: 165 LBS | DIASTOLIC BLOOD PRESSURE: 70 MMHG | OXYGEN SATURATION: 98 % | BODY MASS INDEX: 27.49 KG/M2 | TEMPERATURE: 97.2 F

## 2021-03-09 DIAGNOSIS — D64.9 ANEMIA, UNSPECIFIED: ICD-10-CM

## 2021-03-09 DIAGNOSIS — Z13.228 ENCOUNTER FOR SCREENING FOR OTHER METABOLIC DISORDERS: ICD-10-CM

## 2021-03-09 DIAGNOSIS — Z13.0 ENCOUNTER FOR SCREENING FOR DISEASES OF THE BLOOD AND BLOOD-FORMING ORGANS AND CERTAIN DISORDERS INVOLVING THE IMMUNE MECHANISM: ICD-10-CM

## 2021-03-09 DIAGNOSIS — N40.0 BENIGN PROSTATIC HYPERPLASIA WITHOUT LOWER URINARY TRACT SYMPMS: ICD-10-CM

## 2021-03-09 PROCEDURE — 99213 OFFICE O/P EST LOW 20 MIN: CPT

## 2021-03-09 NOTE — PLAN
[FreeTextEntry1] : Anxiety and Depression\par PHQ9 2 points\par istop Reference #: [id number] \par denies Si/HI\par we had extensive discussion around elder patients on benzo's for use for anxiety as monotherapy and that it is not evidence based. Patient was insistent that he be sent a long term supply of ativan as that is what has worked for him in the past. I advised him that I can send him a short course of it to use on PRN basis for acute episodes of anxiety but to better control his symptoms he needs long acting SSRI such as sertraline. I advised him that I would be sending him SSRI to start with goal of maintaining baseline control of anxiety and to help with mild depression. \par Patient agreed to my short course of ativan and said he will try the SSRI and follow up. \par \par Pre-DM\par reviewed jul 2020 showing a1c 6.1\par rechecking today \par \par BPH \par elevated PSA noted jul 2020\par patient says he is following with urology next week and declined psa to be done today as he said urology would be checking him. \par \par Anemia\par Noted Hb in cbc from jul 2020 at 12.3\par rechecking cbc today. \par \par will call with results. advised pt to f/u in 30 days or earlier if symptoms worsen or he has side effects. \par Patient agrees with plan, all further questions answered during encounter.\par

## 2021-03-09 NOTE — REVIEW OF SYSTEMS
[Negative] : Neurological [Suicidal] : not suicidal [Insomnia] : no insomnia [Anxiety] : no anxiety [Depression] : no depression

## 2021-03-09 NOTE — HISTORY OF PRESENT ILLNESS
[FreeTextEntry1] : pt presents to discuss starting Ativan again. States he has been having issues with anxiety and depression due to covid, losing his job and passing of best friends  [de-identified] : 79 yo male, hx of esophageal cancer s/p resection now in remission, presents today for medical refills. \raymundo Says he feels anxious lately. had to quit job because of pandemic, had friends that passed away last year.\par Also stressed with wife who is not doing well with her health and he is her primary care giver. \par Was previously treated with ativan that he says helped him with his symptoms. Helps with his thoughts that keep him up at night. \raymundo Says that he previously tried paxil that did not agree with him. \raymunod Denies any SI/HI. \par He also requests blood work to be done however he is not fasting.

## 2021-03-12 LAB
ALBUMIN SERPL ELPH-MCNC: 4.4 G/DL
ALP BLD-CCNC: 103 U/L
ALT SERPL-CCNC: 8 U/L
ANION GAP SERPL CALC-SCNC: 10 MMOL/L
AST SERPL-CCNC: 25 U/L
BASOPHILS # BLD AUTO: 0.04 K/UL
BASOPHILS NFR BLD AUTO: 0.8 %
BILIRUB SERPL-MCNC: 0.4 MG/DL
BUN SERPL-MCNC: 24 MG/DL
CALCIUM SERPL-MCNC: 9.6 MG/DL
CHLORIDE SERPL-SCNC: 104 MMOL/L
CO2 SERPL-SCNC: 26 MMOL/L
CREAT SERPL-MCNC: 1.52 MG/DL
EOSINOPHIL # BLD AUTO: 0.08 K/UL
EOSINOPHIL NFR BLD AUTO: 1.7 %
ESTIMATED AVERAGE GLUCOSE: 131 MG/DL
GLUCOSE SERPL-MCNC: 139 MG/DL
HBA1C MFR BLD HPLC: 6.2 %
HCT VFR BLD CALC: 37.4 %
HGB BLD-MCNC: 12.1 G/DL
IMM GRANULOCYTES NFR BLD AUTO: 0.2 %
LYMPHOCYTES # BLD AUTO: 0.72 K/UL
LYMPHOCYTES NFR BLD AUTO: 15.1 %
MAN DIFF?: NORMAL
MCHC RBC-ENTMCNC: 28.2 PG
MCHC RBC-ENTMCNC: 32.4 GM/DL
MCV RBC AUTO: 87.2 FL
MONOCYTES # BLD AUTO: 0.53 K/UL
MONOCYTES NFR BLD AUTO: 11.1 %
NEUTROPHILS # BLD AUTO: 3.4 K/UL
NEUTROPHILS NFR BLD AUTO: 71.1 %
PLATELET # BLD AUTO: 197 K/UL
POTASSIUM SERPL-SCNC: 4.1 MMOL/L
PROT SERPL-MCNC: 7.1 G/DL
RBC # BLD: 4.29 M/UL
RBC # FLD: 15.2 %
SODIUM SERPL-SCNC: 139 MMOL/L
WBC # FLD AUTO: 4.78 K/UL

## 2021-03-30 ENCOUNTER — TRANSCRIPTION ENCOUNTER (OUTPATIENT)
Age: 81
End: 2021-03-30

## 2021-03-31 ENCOUNTER — NON-APPOINTMENT (OUTPATIENT)
Age: 81
End: 2021-03-31

## 2021-04-02 ENCOUNTER — APPOINTMENT (OUTPATIENT)
Dept: FAMILY MEDICINE | Facility: CLINIC | Age: 81
End: 2021-04-02
Payer: MEDICARE

## 2021-04-02 VITALS
DIASTOLIC BLOOD PRESSURE: 80 MMHG | TEMPERATURE: 96.6 F | SYSTOLIC BLOOD PRESSURE: 160 MMHG | BODY MASS INDEX: 26.82 KG/M2 | WEIGHT: 161 LBS | OXYGEN SATURATION: 96 % | RESPIRATION RATE: 14 BRPM | HEIGHT: 65 IN | HEART RATE: 82 BPM

## 2021-04-02 VITALS — DIASTOLIC BLOOD PRESSURE: 72 MMHG | SYSTOLIC BLOOD PRESSURE: 130 MMHG

## 2021-04-02 DIAGNOSIS — G47.00 INSOMNIA, UNSPECIFIED: ICD-10-CM

## 2021-04-02 PROCEDURE — 99213 OFFICE O/P EST LOW 20 MIN: CPT

## 2021-04-02 RX ORDER — LORAZEPAM 0.5 MG/1
0.5 TABLET ORAL TWICE DAILY
Qty: 28 | Refills: 0 | Status: DISCONTINUED | COMMUNITY
Start: 2021-03-09 | End: 2021-04-02

## 2021-04-02 NOTE — HISTORY OF PRESENT ILLNESS
[FreeTextEntry1] : patient presents for med check  [de-identified] : 80 yo male, pmh of anxiety and depression who presents today for follow up. \par Has been on zoloft now. Denies any SI/HI. Does complain of difficulty sleeping. He purchased and took ZZquil to try and sleep but did not help. He saw Psych NP recently, started him on Klonopin but it did not help with sleep. He denies dizziness, sleep walking, falls. Is requesting something to help him sleep because he is feeling very stressed by being unable to sleep. \par \par No other complaints. \par \par \par

## 2021-04-02 NOTE — ASSESSMENT
[FreeTextEntry1] : Anxiety and Depression\par Patient was started on Klonopin by Psych NP\par I advised patient to provide me with the name and number of this provider to get record of his encounter so that we can figure out any co-management needs\par patient says he will call back with provider info\par is tolerating zoloft 50mg daily\par will c/w at this time\par PHQ9 mild depression\par Denies SI/Hi\par checking thyroid studies today, TSH and FT4\par \par Insomnia \par will treat with trazodone 50mg qhs \par will also help serve as adjunct to SSRI treatment for depression\par I counseled patient on potential side effects, drowsiness, nausea, vomiting, GI upset and other symptoms of potential serotonin sickness and advised him to stop meds right away if he experiences these and to call me. \par patient verbalized to me that he will do so. \par \par will call with results. F/u 1 month \par Patient agrees with plan, all further questions answered during encounter.\par

## 2021-04-02 NOTE — PHYSICAL EXAM
[No Lymphadenopathy] : no lymphadenopathy [Thyroid Normal, No Nodules] : the thyroid was normal and there were no nodules present [Supple] : supple [Normal] : normal rate, regular rhythm, normal S1 and S2 and no murmur heard [No Focal Deficits] : no focal deficits [Coordination Grossly Intact] : coordination grossly intact [Normal Gait] : normal gait [Normal Affect] : the affect was normal [Normal Insight/Judgement] : insight and judgment were intact

## 2021-04-06 ENCOUNTER — NON-APPOINTMENT (OUTPATIENT)
Age: 81
End: 2021-04-06

## 2021-04-06 LAB
T4 FREE SERPL-MCNC: 1.2 NG/DL
TSH SERPL-ACNC: 1.68 UIU/ML

## 2021-04-21 ENCOUNTER — NON-APPOINTMENT (OUTPATIENT)
Age: 81
End: 2021-04-21

## 2021-06-01 ENCOUNTER — APPOINTMENT (OUTPATIENT)
Dept: FAMILY MEDICINE | Facility: CLINIC | Age: 81
End: 2021-06-01
Payer: MEDICARE

## 2021-06-01 VITALS
BODY MASS INDEX: 27.99 KG/M2 | DIASTOLIC BLOOD PRESSURE: 74 MMHG | RESPIRATION RATE: 14 BRPM | SYSTOLIC BLOOD PRESSURE: 118 MMHG | WEIGHT: 168 LBS | OXYGEN SATURATION: 98 % | HEIGHT: 65 IN | TEMPERATURE: 97.7 F | HEART RATE: 79 BPM

## 2021-06-01 PROCEDURE — 99213 OFFICE O/P EST LOW 20 MIN: CPT

## 2021-06-01 RX ORDER — TRAZODONE HYDROCHLORIDE 50 MG/1
50 TABLET ORAL
Qty: 30 | Refills: 0 | Status: DISCONTINUED | COMMUNITY
Start: 2021-04-02 | End: 2021-06-01

## 2021-06-01 RX ORDER — QUETIAPINE FUMARATE 50 MG/1
50 TABLET ORAL
Qty: 60 | Refills: 0 | Status: DISCONTINUED | COMMUNITY
Start: 2021-05-14 | End: 2021-06-01

## 2021-06-01 RX ORDER — PAROXETINE HYDROCHLORIDE 10 MG/1
10 TABLET, FILM COATED ORAL
Qty: 15 | Refills: 0 | Status: DISCONTINUED | COMMUNITY
Start: 2021-05-03 | End: 2021-06-01

## 2021-06-01 RX ORDER — HYDROXYZINE PAMOATE 25 MG/1
25 CAPSULE ORAL
Qty: 7 | Refills: 0 | Status: DISCONTINUED | COMMUNITY
Start: 2021-04-03 | End: 2021-06-01

## 2021-06-01 RX ORDER — CLONAZEPAM 0.5 MG/1
0.5 TABLET ORAL DAILY
Refills: 0 | Status: DISCONTINUED | COMMUNITY
End: 2021-06-01

## 2021-06-01 RX ORDER — SERTRALINE 25 MG/1
25 TABLET, FILM COATED ORAL
Qty: 60 | Refills: 1 | Status: DISCONTINUED | COMMUNITY
Start: 2021-03-09 | End: 2021-06-01

## 2021-06-01 NOTE — REVIEW OF SYSTEMS
[Joint Pain] : joint pain [Joint Swelling] : joint swelling [Negative] : Gastrointestinal [Joint Stiffness] : no joint stiffness [Muscle Pain] : no muscle pain [Muscle Weakness] : no muscle weakness [Back Pain] : no back pain [Suicidal] : not suicidal [Insomnia] : no insomnia [Anxiety] : no anxiety [Depression] : no depression [FreeTextEntry9] : right ankle pain.

## 2021-06-01 NOTE — ASSESSMENT
[FreeTextEntry1] : Acute Right Ankle Pain\par xray right ankle ordered\par PT script for evaluation given \par suspect sprain however patient with bony point tenderness\par ibuprofen prn for pain relief\par if not improved, return for re-evaluation\par \par Depression with Anxiety\par stable\par on fluxox, seroquel\par following outpatient psych\par denies SI/HI\par reviewed hospital dc note from inpatient psych at [facility]\par \par Pre-Diabetes\par checking a1c, cmp, cbc today\par \par will call with results.F/u 3 months or sooner prn \par Patient agrees with plan, all further questions answered during encounter.\par

## 2021-06-01 NOTE — HISTORY OF PRESENT ILLNESS
[FreeTextEntry1] : pt presents to discuss getting blood work done to check A1C.\par also c/o discomfort right ankle, +swelling, -numbness/tingling, +tender to touch, +limited ROM, x 2 weeks  [de-identified] : 82 yo male, pmh of depression, anxiety, delusional thoughts, preDM presents today for follow up. \par \par Was admitted to inpatient psych for depression with intrusive thoughts, admitted for 2 weeks. \par Was DC'd, followed outpatient psych, Dr. Tee doing well on luvox and seroquel.\par Due to follow up again in a few weeks. Does not need refills at this time. Denies any SI/HI.  \par \par 2 weeks of right ankle pain. \par No trauma to ankle. \par painful on medial side of right foot. 3/10 severity. \par Tried ibuprofen that did not really help him. \par No difficulty with gait or weight bearing. \par No prior episodes like this in past. \par \par Also requests updated blood work. \par No other complaints today.

## 2021-06-01 NOTE — PHYSICAL EXAM
[Normal] : normal rate, regular rhythm, normal S1 and S2 and no murmur heard [Pedal Pulses Present] : the pedal pulses are present [No Edema] : there was no peripheral edema [No Joint Swelling] : no joint swelling [Grossly Normal Strength/Tone] : grossly normal strength/tone [Coordination Grossly Intact] : coordination grossly intact [No Focal Deficits] : no focal deficits [Normal Gait] : normal gait [Speech Grossly Normal] : speech grossly normal [Normal Affect] : the affect was normal [Normal Mood] : the mood was normal [Normal Insight/Judgement] : insight and judgment were intact [de-identified] : tenderness over superior aspect or right medial malleolus. normal ROM. nontender when dorsi and plantarflexing feet.

## 2021-06-04 LAB
ALBUMIN SERPL ELPH-MCNC: 4.3 G/DL
ALP BLD-CCNC: 118 U/L
ALT SERPL-CCNC: 10 U/L
ANION GAP SERPL CALC-SCNC: 11 MMOL/L
AST SERPL-CCNC: 20 U/L
BASOPHILS # BLD AUTO: 0.06 K/UL
BASOPHILS NFR BLD AUTO: 1 %
BILIRUB SERPL-MCNC: 0.2 MG/DL
BUN SERPL-MCNC: 19 MG/DL
CALCIUM SERPL-MCNC: 9.1 MG/DL
CHLORIDE SERPL-SCNC: 106 MMOL/L
CO2 SERPL-SCNC: 25 MMOL/L
CREAT SERPL-MCNC: 1.64 MG/DL
EOSINOPHIL # BLD AUTO: 0.12 K/UL
EOSINOPHIL NFR BLD AUTO: 2 %
ESTIMATED AVERAGE GLUCOSE: 134 MG/DL
GLUCOSE SERPL-MCNC: 95 MG/DL
HBA1C MFR BLD HPLC: 6.3 %
HCT VFR BLD CALC: 35.3 %
HGB BLD-MCNC: 10.7 G/DL
IMM GRANULOCYTES NFR BLD AUTO: 0.7 %
LYMPHOCYTES # BLD AUTO: 1 K/UL
LYMPHOCYTES NFR BLD AUTO: 16.7 %
MAN DIFF?: NORMAL
MCHC RBC-ENTMCNC: 26.6 PG
MCHC RBC-ENTMCNC: 30.3 GM/DL
MCV RBC AUTO: 87.8 FL
MONOCYTES # BLD AUTO: 0.71 K/UL
MONOCYTES NFR BLD AUTO: 11.9 %
NEUTROPHILS # BLD AUTO: 4.06 K/UL
NEUTROPHILS NFR BLD AUTO: 67.7 %
PLATELET # BLD AUTO: 208 K/UL
POTASSIUM SERPL-SCNC: 4.7 MMOL/L
PROT SERPL-MCNC: 7.1 G/DL
RBC # BLD: 4.02 M/UL
RBC # FLD: 14.9 %
SODIUM SERPL-SCNC: 143 MMOL/L
WBC # FLD AUTO: 5.99 K/UL

## 2021-06-11 ENCOUNTER — APPOINTMENT (OUTPATIENT)
Dept: FAMILY MEDICINE | Facility: CLINIC | Age: 81
End: 2021-06-11

## 2021-08-24 ENCOUNTER — APPOINTMENT (OUTPATIENT)
Dept: FAMILY MEDICINE | Facility: CLINIC | Age: 81
End: 2021-08-24
Payer: MEDICARE

## 2021-08-24 VITALS
SYSTOLIC BLOOD PRESSURE: 120 MMHG | BODY MASS INDEX: 27.99 KG/M2 | WEIGHT: 168 LBS | RESPIRATION RATE: 14 BRPM | HEART RATE: 81 BPM | OXYGEN SATURATION: 98 % | DIASTOLIC BLOOD PRESSURE: 70 MMHG | HEIGHT: 65 IN

## 2021-08-24 DIAGNOSIS — F41.8 OTHER SPECIFIED ANXIETY DISORDERS: ICD-10-CM

## 2021-08-24 PROCEDURE — 36415 COLL VENOUS BLD VENIPUNCTURE: CPT

## 2021-08-24 PROCEDURE — 99214 OFFICE O/P EST MOD 30 MIN: CPT | Mod: 25

## 2021-08-24 RX ORDER — QUETIAPINE FUMARATE 50 MG/1
50 TABLET ORAL
Refills: 0 | Status: ACTIVE | COMMUNITY
Start: 2021-05-03

## 2021-08-24 NOTE — ASSESSMENT
[FreeTextEntry1] : Paroxysmal Afib\par cardiology consult noted from aug 2021\par not on AC due to no recurrences of afib noted by cardio\par consideration for stress test and patient is pending a decision if he wishes to get done. \par currently normal rate, not in afib on ausculation and radial pulse palpation. \par discussed signs and symptoms with patient and if he experiences persistent episodes of palpitations, he should be seen. \par \par Depression with Anxiety \par chronic, stable\par denies SI/HI\par follows with [facility] \par dose of seroquel decrease to 50mg qhs \par c/w with SSRi\par checking tsh/t4 and cmp to evaluate electrolytes on ssri. \par \par Prediabetes, chronic\par a1c noted at 6.3 in june 2021\par checking again as per patient request as he says his diet has not been as great. \par \par will call with lab results\par Patient agrees with plan, all further questions answered during encounter.\par \par

## 2021-08-24 NOTE — PHYSICAL EXAM
[No Lymphadenopathy] : no lymphadenopathy [Supple] : supple [Coordination Grossly Intact] : coordination grossly intact [No Focal Deficits] : no focal deficits [Normal Gait] : normal gait [Normal Mental Status] : the patient's orientation, memory, attention, language and fund of knowledge were normal [Appropriate] : appropriate [Normal Rate] : a normal rate [Normal Rhythm] : a normal rhythm [Normal Tone] : normal tone [Normal Volume] : normal volume [Normal] : normal throught processes [Normal Associations] :  no deficiency [Suicidal Ideation] : denied suicidal ideation [Suicidal Intent] : denied suicidal intent [Suicidal Plan] : denied suicidal plans [Homicidal Ideation] : denied homicidal ideation [Homicidal Intent] : denied homicidal intention [Homicidal Plan] : denied homicidal plans [de-identified] : trace right lower ext edema, none noted over left. no calf tenderness bilaterally

## 2021-08-24 NOTE — REVIEW OF SYSTEMS
[Negative] : Neurological [Chest Pain] : no chest pain [Claudication] : no  leg claudication [Lower Ext Edema] : no lower extremity edema [Orthopena] : no orthopnea [FreeTextEntry5] : intermittent palpitations that lasts a few seconds that resolves.

## 2021-08-24 NOTE — HISTORY OF PRESENT ILLNESS
[de-identified] : 80 yo male, hx of paroxysmal afib, prior DVT, Depression who presents today for follow up. \par Says that previously over summer when he had US done at [facility], was told that he had ?recurrent dvt? and was sent to [facility] for evaluation. He says that at that time, was started back on eliquis. He went to [facility] that pt had followed with for dvt he had 8 years prior who said that his dvt was no longer visible and likely had resolved vs never was an actual dvt. He was stopped on eliquis at that time. \par Has been following with Psych, Dr. Tee from [facility]. \par Doing well. Had dose of seroquel reduced. \par He also saw cardio, considering stress test to evaluate but patient says that he is considering it. There was discussion for event recorder to try and capture afib episodes as they have been unable to note anything on ekg when he visits since his esophageal Ca surgery where he was noted to have had afib transiently. Patient says he is not sure if he wants the loop recorder because it might be uncomfortable but he will decide and let cardiology know. \par No other complaints today. Says that he has not been as good with this diet as of late and is concerned perhaps his blood sugar numbers have gone up.   [FreeTextEntry1] : pt presents for follow up

## 2021-08-27 LAB
ALBUMIN SERPL ELPH-MCNC: 4.6 G/DL
ALP BLD-CCNC: 132 U/L
ALT SERPL-CCNC: 8 U/L
ANION GAP SERPL CALC-SCNC: 12 MMOL/L
AST SERPL-CCNC: 19 U/L
BASOPHILS # BLD AUTO: 0.06 K/UL
BASOPHILS NFR BLD AUTO: 1 %
BILIRUB SERPL-MCNC: 0.2 MG/DL
BUN SERPL-MCNC: 25 MG/DL
CALCIUM SERPL-MCNC: 9.3 MG/DL
CHLORIDE SERPL-SCNC: 104 MMOL/L
CO2 SERPL-SCNC: 25 MMOL/L
CREAT SERPL-MCNC: 1.74 MG/DL
EOSINOPHIL # BLD AUTO: 0.09 K/UL
EOSINOPHIL NFR BLD AUTO: 1.5 %
ESTIMATED AVERAGE GLUCOSE: 137 MG/DL
GLUCOSE SERPL-MCNC: 87 MG/DL
HBA1C MFR BLD HPLC: 6.4 %
HCT VFR BLD CALC: 34.8 %
HGB BLD-MCNC: 10.7 G/DL
IMM GRANULOCYTES NFR BLD AUTO: 0.5 %
LYMPHOCYTES # BLD AUTO: 0.93 K/UL
LYMPHOCYTES NFR BLD AUTO: 15.8 %
MAN DIFF?: NORMAL
MCHC RBC-ENTMCNC: 27 PG
MCHC RBC-ENTMCNC: 30.7 GM/DL
MCV RBC AUTO: 87.7 FL
MONOCYTES # BLD AUTO: 0.69 K/UL
MONOCYTES NFR BLD AUTO: 11.8 %
NEUTROPHILS # BLD AUTO: 4.07 K/UL
NEUTROPHILS NFR BLD AUTO: 69.4 %
PLATELET # BLD AUTO: 210 K/UL
POTASSIUM SERPL-SCNC: 4.9 MMOL/L
PROT SERPL-MCNC: 7.1 G/DL
RBC # BLD: 3.97 M/UL
RBC # FLD: 14.7 %
SODIUM SERPL-SCNC: 140 MMOL/L
TSH SERPL-ACNC: 3.21 UIU/ML
WBC # FLD AUTO: 5.87 K/UL

## 2022-04-11 PROBLEM — Z11.59 SCREENING FOR VIRAL DISEASE: Status: ACTIVE | Noted: 2020-07-20

## 2022-06-15 ENCOUNTER — APPOINTMENT (OUTPATIENT)
Dept: FAMILY MEDICINE | Facility: CLINIC | Age: 82
End: 2022-06-15
Payer: MEDICARE

## 2022-06-15 ENCOUNTER — LABORATORY RESULT (OUTPATIENT)
Age: 82
End: 2022-06-15

## 2022-06-15 VITALS
BODY MASS INDEX: 27.99 KG/M2 | OXYGEN SATURATION: 97 % | WEIGHT: 168 LBS | HEART RATE: 60 BPM | SYSTOLIC BLOOD PRESSURE: 110 MMHG | HEIGHT: 65 IN | DIASTOLIC BLOOD PRESSURE: 60 MMHG | RESPIRATION RATE: 15 BRPM | TEMPERATURE: 97.5 F

## 2022-06-15 DIAGNOSIS — H00.015 HORDEOLUM EXTERNUM LEFT LOWER EYELID: ICD-10-CM

## 2022-06-15 PROCEDURE — 36415 COLL VENOUS BLD VENIPUNCTURE: CPT

## 2022-06-15 PROCEDURE — 99214 OFFICE O/P EST MOD 30 MIN: CPT | Mod: 25

## 2022-06-15 NOTE — PHYSICAL EXAM
[Normal Sclera/Conjunctiva] : normal sclera/conjunctiva [PERRL] : pupils equal round and reactive to light [EOMI] : extraocular movements intact [___] : a [unfilled] ~Umm stye was noted on the lower eyelid [Normal] : no respiratory distress, lungs were clear to auscultation bilaterally and no accessory muscle use [Normal Rate] : normal rate  [Regular Rhythm] : with a regular rhythm [Normal S1, S2] : normal S1 and S2 [Soft] : abdomen soft [Non Tender] : non-tender [Normal Bowel Sounds] : normal bowel sounds [Coordination Grossly Intact] : coordination grossly intact [No Focal Deficits] : no focal deficits [de-identified] : RLQ noted for 1.5 cm x 1.5 cm area of erythema, induration and mild warmth with noted puncture point in skin.

## 2022-06-15 NOTE — HISTORY OF PRESENT ILLNESS
[FreeTextEntry8] : Patient presents for tick bite on lower abdomen right side noticed 5 days ago slightly irritated around area , also has a stye bottom lid left eye \par \par 83 yo male, hx of afib, ckd who presents today for eval for tick bite and for stye. \par Says that he First noted it  5 days ago and thought was pimple \par Realized it was tick, then took it off yesterday. \par No fevers or flu like symptoms. \par It was still alive and he got it all out. He brought it in bottle to office today to show it is not engorged. \par says the bite area on stomach is a bit itchy, did not note any bulleye rash. \par \par Also with complaint of left eye noted to have stye \par presents for several weeks\par has occasional gritty sensation \par no blurry vision \par no contact use \par denies discharge out of eye \par \par Says he saw cardio 5 weeks ago for afib. Was told everything normal. \par Accepts blood work but is not fasting, will do other fasting studies on next f/u visit. \par \par He has no other complaints today. \par \par

## 2022-06-15 NOTE — ASSESSMENT
[FreeTextEntry1] : Tick Bite complicated by cellulitis of abdominal wall\par will treat with po doxycycline\par is mild condition but advised to call me if noted worsening expansion of redness or pain\par counseled on doxycycline side effects, avoid sun and take with plenty of food/water\par tick panel ordered to eval for tick borne illness, rmsf, alpha gal\par advised till need to f/u 8 weeks for repeat labs \par \par AFIB\par paroxysmal, not currently in afib\par Cardio Consult, pending recent visit records from last month\par Labs checking for lytes in bmp \par VTE PPx with only with asa \par \par CKD\par chronic\par cmp from 4/2021 noted \par checking renal function today with bmp \par \par Stye\par warm compresses to affected eye\par abx drops to affected eye x 5 days\par f/u if worsening \par \par f/u 3 months for all routine care, sooner prn based on lab studies\par labs drawn in office and patient will be notified of results when available\par Patient agrees with plan, all further questions answered during encounter.\par

## 2022-06-15 NOTE — REVIEW OF SYSTEMS
[Itching] : itching [Skin Rash] : skin rash [Negative] : Neurological [Discharge] : no discharge [Pain] : no pain [Redness] : no redness [Dryness] : no dryness [Vision Problems] : no vision problems [Itching] : no itching [Mole Changes] : no mole changes [Nail Changes] : no nail changes [FreeTextEntry3] : stye [de-identified] : insect bite on right lower abdomen

## 2022-06-21 LAB
A PHAGOCYTOPH IGG TITR SER IF: NORMAL TITER
ANION GAP SERPL CALC-SCNC: 12 MMOL/L
B BURGDOR AB SER QL IA: NEGATIVE
B MICROTI IGG TITR SER: NORMAL TITER
BASOPHILS # BLD AUTO: 0.03 K/UL
BASOPHILS NFR BLD AUTO: 1 %
BUN SERPL-MCNC: 21 MG/DL
CALCIUM SERPL-MCNC: 9.4 MG/DL
CHLORIDE SERPL-SCNC: 103 MMOL/L
CO2 SERPL-SCNC: 24 MMOL/L
CREAT SERPL-MCNC: 1.74 MG/DL
E CHAFFEENSIS IGG TITR SER IF: NORMAL TITER
EGFR: 38 ML/MIN/1.73M2
EOSINOPHIL # BLD AUTO: 0.1 K/UL
EOSINOPHIL NFR BLD AUTO: 3.2 %
GALACTOSE, ALPHA (O215) CONC: 0.33 KUA/L
GALACTOSE-ALPHA-1,3-GALACTOSE IGE: ABNORMAL
GLUCOSE SERPL-MCNC: 103 MG/DL
HCT VFR BLD CALC: 32.8 %
HGB BLD-MCNC: 10 G/DL
IMM GRANULOCYTES NFR BLD AUTO: 0.3 %
LYMPHOCYTES # BLD AUTO: 0.62 K/UL
LYMPHOCYTES NFR BLD AUTO: 19.9 %
MAN DIFF?: NORMAL
MCHC RBC-ENTMCNC: 25.8 PG
MCHC RBC-ENTMCNC: 30.5 GM/DL
MCV RBC AUTO: 84.5 FL
MONOCYTES # BLD AUTO: 0.5 K/UL
MONOCYTES NFR BLD AUTO: 16.1 %
NEUTROPHILS # BLD AUTO: 1.85 K/UL
NEUTROPHILS NFR BLD AUTO: 59.5 %
PLATELET # BLD AUTO: 179 K/UL
POTASSIUM SERPL-SCNC: 4.6 MMOL/L
RBC # BLD: 3.88 M/UL
RBC # FLD: 15.5 %
SODIUM SERPL-SCNC: 140 MMOL/L
WBC # FLD AUTO: 3.11 K/UL

## 2022-06-23 ENCOUNTER — RX RENEWAL (OUTPATIENT)
Age: 82
End: 2022-06-23

## 2022-06-28 LAB
R RICKETTSI IGG CSF-ACNC: POSITIVE
R RICKETTSI IGM CSF-ACNC: 1.42 INDEX

## 2022-07-12 ENCOUNTER — APPOINTMENT (OUTPATIENT)
Dept: FAMILY MEDICINE | Facility: CLINIC | Age: 82
End: 2022-07-12

## 2022-07-12 VITALS
RESPIRATION RATE: 13 BRPM | OXYGEN SATURATION: 98 % | SYSTOLIC BLOOD PRESSURE: 110 MMHG | HEIGHT: 65 IN | BODY MASS INDEX: 27.99 KG/M2 | DIASTOLIC BLOOD PRESSURE: 60 MMHG | HEART RATE: 88 BPM | WEIGHT: 168 LBS

## 2022-07-12 VITALS — TEMPERATURE: 97.3 F

## 2022-07-12 DIAGNOSIS — Z87.898 PERSONAL HISTORY OF OTHER SPECIFIED CONDITIONS: ICD-10-CM

## 2022-07-12 DIAGNOSIS — R05.9 COUGH, UNSPECIFIED: ICD-10-CM

## 2022-07-12 DIAGNOSIS — R09.82 POSTNASAL DRIP: ICD-10-CM

## 2022-07-12 DIAGNOSIS — J06.9 ACUTE UPPER RESPIRATORY INFECTION, UNSPECIFIED: ICD-10-CM

## 2022-07-12 PROCEDURE — 99213 OFFICE O/P EST LOW 20 MIN: CPT

## 2022-07-12 NOTE — HISTORY OF PRESENT ILLNESS
[FreeTextEntry8] : Patient states last visit he was bit by a tick and a couple days later after his visit had a fever with a cough. Patient was tested for covid when he was initially sick, and again today before his visit. Patient states he doesn't normally get sick and believes it has to do with the tick.\par \par 81 yo male, presents for acute visit concerning for respiratory illness. \par Has complaint of runny nose, persistent coughing with yellow color phelgm \par +headache\par He had fever last night, 102.6\par took tylenol and improved.  \par no chest pain\par no dyspnea\par has been present for 1 week. \par +body aches\par

## 2022-07-12 NOTE — REVIEW OF SYSTEMS
[Fever] : fever [Chills] : chills [Postnasal Drip] : postnasal drip [Nasal Discharge] : nasal discharge [Sore Throat] : sore throat [Hoarseness] : hoarseness [Cough] : cough [Negative] : Genitourinary [Shortness Of Breath] : no shortness of breath [Wheezing] : no wheezing [Dyspnea on Exertion] : not dyspnea on exertion

## 2022-07-12 NOTE — ASSESSMENT
[FreeTextEntry1] : Bronchitis with cough and PND\par Atrovent nasal spray to dry out post nasal drip, advised to avoid if headache or nose bleeds\par Sending PO Prednisone course to help with reactive airway processes from infection\par Azithro course x 6 days for abx coverage. \par RVp panel ordered to eval for viral etiologies \par tylenol prn for fever \par Patient advised to continue any prescribed and OTC medication for symptomatic relief. \par \par Patient agrees with plan, all further questions answered during encounter.\par \par \par

## 2022-07-12 NOTE — PHYSICAL EXAM
[Normal] : normal sclera/conjunctiva, pupils are equal, round and reactive to light and extraocular movements are intact [Normal Outer Ear/Nose] : the outer ears and nose were normal in appearance [No Lymphadenopathy] : no lymphadenopathy [Supple] : supple [No Respiratory Distress] : no respiratory distress  [Normal Rate] : normal rate  [Regular Rhythm] : with a regular rhythm [Normal S1, S2] : normal S1 and S2 [Soft] : abdomen soft [Non Tender] : non-tender [Normal Bowel Sounds] : normal bowel sounds [Coordination Grossly Intact] : coordination grossly intact [No Focal Deficits] : no focal deficits [Normal Gait] : normal gait [de-identified] : boggy nasal mucosa.  [de-identified] : scattered expiratory wheeze and coarse breath sounds in left base.

## 2022-07-13 LAB
RAPID RVP RESULT: NOT DETECTED
SARS-COV-2 RNA PNL RESP NAA+PROBE: NOT DETECTED

## 2022-08-25 DIAGNOSIS — Z86.79 PERSONAL HISTORY OF OTHER DISEASES OF THE CIRCULATORY SYSTEM: ICD-10-CM

## 2022-08-25 DIAGNOSIS — R01.1 CARDIAC MURMUR, UNSPECIFIED: ICD-10-CM

## 2022-08-25 DIAGNOSIS — Z92.29 PERSONAL HISTORY OF OTHER DRUG THERAPY: ICD-10-CM

## 2022-08-25 RX ORDER — POLYMYXIN B SULFATE AND TRIMETHOPRIM 10000; 1 [USP'U]/ML; MG/ML
10000-0.1 SOLUTION OPHTHALMIC
Qty: 1 | Refills: 0 | Status: DISCONTINUED | COMMUNITY
Start: 2022-06-15 | End: 2022-08-25

## 2022-08-25 RX ORDER — DOXYCYCLINE 100 MG/1
100 CAPSULE ORAL TWICE DAILY
Qty: 28 | Refills: 0 | Status: DISCONTINUED | COMMUNITY
Start: 2022-06-15 | End: 2022-08-25

## 2022-08-25 RX ORDER — FLUVOXAMINE MALEATE 25 MG/1
25 TABLET, FILM COATED ORAL
Refills: 0 | Status: ACTIVE | COMMUNITY
Start: 2021-04-19

## 2022-08-25 RX ORDER — PREDNISONE 10 MG/1
10 TABLET ORAL DAILY
Qty: 20 | Refills: 0 | Status: DISCONTINUED | COMMUNITY
Start: 2022-07-12 | End: 2022-08-25

## 2022-08-25 RX ORDER — AZITHROMYCIN 250 MG/1
250 TABLET, FILM COATED ORAL
Qty: 1 | Refills: 0 | Status: DISCONTINUED | COMMUNITY
Start: 2022-07-12 | End: 2022-08-25

## 2022-08-25 RX ORDER — ASPIRIN 81 MG/1
81 TABLET, CHEWABLE ORAL
Refills: 0 | Status: ACTIVE | COMMUNITY

## 2022-08-29 ENCOUNTER — APPOINTMENT (OUTPATIENT)
Dept: ORTHOPEDIC SURGERY | Facility: CLINIC | Age: 82
End: 2022-08-29

## 2023-05-02 ENCOUNTER — APPOINTMENT (OUTPATIENT)
Dept: FAMILY MEDICINE | Facility: CLINIC | Age: 83
End: 2023-05-02
Payer: MEDICARE

## 2023-05-02 ENCOUNTER — NON-APPOINTMENT (OUTPATIENT)
Age: 83
End: 2023-05-02

## 2023-05-02 VITALS
BODY MASS INDEX: 27.66 KG/M2 | RESPIRATION RATE: 13 BRPM | HEIGHT: 65 IN | SYSTOLIC BLOOD PRESSURE: 126 MMHG | DIASTOLIC BLOOD PRESSURE: 70 MMHG | TEMPERATURE: 98 F | HEART RATE: 96 BPM | WEIGHT: 166 LBS | OXYGEN SATURATION: 98 %

## 2023-05-02 DIAGNOSIS — R73.03 PREDIABETES.: ICD-10-CM

## 2023-05-02 DIAGNOSIS — Z11.2 ENCOUNTER FOR SCREENING FOR OTHER BACTERIAL DISEASES: ICD-10-CM

## 2023-05-02 DIAGNOSIS — I48.0 PAROXYSMAL ATRIAL FIBRILLATION: ICD-10-CM

## 2023-05-02 DIAGNOSIS — E78.5 HYPERLIPIDEMIA, UNSPECIFIED: ICD-10-CM

## 2023-05-02 DIAGNOSIS — Z11.8 ENCOUNTER FOR SCREENING FOR OTHER BACTERIAL DISEASES: ICD-10-CM

## 2023-05-02 DIAGNOSIS — R07.89 OTHER CHEST PAIN: ICD-10-CM

## 2023-05-02 DIAGNOSIS — F41.9 ANXIETY DISORDER, UNSPECIFIED: ICD-10-CM

## 2023-05-02 PROCEDURE — 99214 OFFICE O/P EST MOD 30 MIN: CPT | Mod: 25

## 2023-05-02 PROCEDURE — 93000 ELECTROCARDIOGRAM COMPLETE: CPT

## 2023-05-02 RX ORDER — IPRATROPIUM BROMIDE 21 UG/1
0.03 SPRAY NASAL
Qty: 90 | Refills: 1 | Status: DISCONTINUED | COMMUNITY
Start: 2022-07-12 | End: 2023-05-02

## 2023-05-02 NOTE — PHYSICAL EXAM
[No Lymphadenopathy] : no lymphadenopathy [Normal Rate] : normal rate  [Regular Rhythm] : with a regular rhythm [Normal S1, S2] : normal S1 and S2 [No Edema] : there was no peripheral edema [Soft] : abdomen soft [Non Tender] : non-tender [Non-distended] : non-distended [Normal Bowel Sounds] : normal bowel sounds [Coordination Grossly Intact] : coordination grossly intact [No Focal Deficits] : no focal deficits [Normal Gait] : normal gait [Normal] : affect was normal and insight and judgment were intact [de-identified] : nontender chest wall  [de-identified] : no calf tenderness bilaterally

## 2023-05-02 NOTE — HISTORY OF PRESENT ILLNESS
[FreeTextEntry8] : Patient states he'd like to have some updated blood work. He also states he's been under a lot of stress lately with his wife in the hospital and has been feeling some chest discomfort and would like an EKG.\par \par 84 yo, male, hx of anxiety, gerd who presents today for follow up. \par Says lately he has been stressed with wife being in nursing home. \par Says he was having intermittent chest discomfort with tingling sensation over left chest, short duration. Described as a 'twinge' that resolved. Says he has been lifting heavy objects lately and helping his wife around the nursing home and could have strained something. Did feel some palpitations that also lasted for a short duration then self resolved. Not currently having any chest pain now. He wanted ekg. Says he will follow with cardio for further evaluation. \par Follows Dr. Tee from psych every 6 months for anxiety. \par Would like to check labs fasting at outside lab. \par no other complaints today.

## 2023-05-02 NOTE — HEALTH RISK ASSESSMENT
[Never] : Never [0] : 2) Feeling down, depressed, or hopeless: Not at all (0) [PHQ-2 Negative - No further assessment needed] : PHQ-2 Negative - No further assessment needed [[id number]] : 0

## 2023-05-02 NOTE — REVIEW OF SYSTEMS
[Palpitations] : palpitations [Negative] : Musculoskeletal [Chest Pain] : no chest pain [Claudication] : no  leg claudication [Lower Ext Edema] : no lower extremity edema [Orthopena] : no orthopnea [Paroxysmal Nocturnal Dyspnea] : no paroxysmal nocturnal dyspnea

## 2023-05-02 NOTE — ASSESSMENT
[FreeTextEntry1] : Chest discomfort\par EKG ordered and done in office today. \par NSR rate 81, no s-st changes noted. \par Not in active pain. \par Advised f/u with cardio if still symptomatic\par cardio consult from 1/2023 noted\par \par AFIB, paroxysmal \par prior hx, never documented on holter per cardio\par Rate controlled  \par Cardio Consult 1/2023 noted \par Labs checking for lytes with cmp \par not currently on VTE PPx\par \par HLD\par LFTs today\par Fasting lipid profile ordered today.\par \par pre-DM\par cmp today with fasting glucose\par prior a1c from 6/2022 noted 6.4 \par if glucose still elevated, will add on a1c to r/o diabetes\par \par CKD3b\par repeat cmp ordered today\par avoid nephrotoxic agents\par \par Anxiety\par follows with [facility]\par c/w fluvoxamine 25 mg daily and seroquel 50mg qhs \par checking tsh and t4 today \par \par Screen for ticks\par repeat tick panel ordered today\par \par f/u plan to be based on results of labs\par labs to be done at outside lab, will notify patient of results when available and received.\par Patient agrees with plan, all further questions answered during encounter.\par

## 2023-05-11 ENCOUNTER — NON-APPOINTMENT (OUTPATIENT)
Age: 83
End: 2023-05-11

## 2023-10-23 ENCOUNTER — APPOINTMENT (OUTPATIENT)
Dept: FAMILY MEDICINE | Facility: CLINIC | Age: 83
End: 2023-10-23
Payer: MEDICARE

## 2023-10-23 VITALS
DIASTOLIC BLOOD PRESSURE: 70 MMHG | WEIGHT: 159 LBS | HEART RATE: 76 BPM | HEIGHT: 65 IN | RESPIRATION RATE: 14 BRPM | BODY MASS INDEX: 26.49 KG/M2 | SYSTOLIC BLOOD PRESSURE: 122 MMHG | TEMPERATURE: 98.2 F | OXYGEN SATURATION: 98 %

## 2023-10-23 DIAGNOSIS — S70.369A INSECT BITE (NONVENOMOUS), UNSPECIFIED THIGH, INITIAL ENCOUNTER: ICD-10-CM
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
## 2023-10-23 DIAGNOSIS — R06.89 OTHER ABNORMALITIES OF BREATHING: ICD-10-CM

## 2023-10-23 DIAGNOSIS — J06.9 ACUTE UPPER RESPIRATORY INFECTION, UNSPECIFIED: ICD-10-CM

## 2023-10-23 DIAGNOSIS — L03.311 CELLULITIS OF ABDOMINAL WALL: ICD-10-CM
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
## 2023-10-23 DIAGNOSIS — M25.571 PAIN IN RIGHT ANKLE AND JOINTS OF RIGHT FOOT: ICD-10-CM

## 2023-10-23 DIAGNOSIS — Z87.09 PERSONAL HISTORY OF OTHER DISEASES OF THE RESPIRATORY SYSTEM: ICD-10-CM

## 2023-10-23 DIAGNOSIS — S30.861A INSECT BITE (NONVENOMOUS) OF ABDOMINAL WALL, INITIAL ENCOUNTER: ICD-10-CM

## 2023-10-23 PROCEDURE — 99213 OFFICE O/P EST LOW 20 MIN: CPT

## 2023-10-24 LAB
INFLUENZA A RESULT: NOT DETECTED
INFLUENZA B RESULT: NOT DETECTED
RESP SYN VIRUS RESULT: NOT DETECTED
SARS-COV-2 RESULT: NOT DETECTED

## 2023-11-30 ENCOUNTER — APPOINTMENT (OUTPATIENT)
Dept: ORTHOPEDIC SURGERY | Facility: CLINIC | Age: 83
End: 2023-11-30
Payer: MEDICARE

## 2023-11-30 VITALS — BODY MASS INDEX: 26.49 KG/M2 | WEIGHT: 159 LBS | HEIGHT: 65 IN

## 2023-11-30 DIAGNOSIS — M75.81 OTHER SHOULDER LESIONS, RIGHT SHOULDER: ICD-10-CM

## 2023-11-30 DIAGNOSIS — M19.011 PRIMARY OSTEOARTHRITIS, RIGHT SHOULDER: ICD-10-CM

## 2023-11-30 DIAGNOSIS — M75.41 IMPINGEMENT SYNDROME OF RIGHT SHOULDER: ICD-10-CM

## 2023-11-30 PROCEDURE — 99203 OFFICE O/P NEW LOW 30 MIN: CPT | Mod: 25

## 2023-11-30 PROCEDURE — 73030 X-RAY EXAM OF SHOULDER: CPT | Mod: RT

## 2023-11-30 PROCEDURE — 99213 OFFICE O/P EST LOW 20 MIN: CPT

## 2023-11-30 PROCEDURE — 20610 DRAIN/INJ JOINT/BURSA W/O US: CPT | Mod: RT

## 2023-12-22 ENCOUNTER — APPOINTMENT (OUTPATIENT)
Dept: FAMILY MEDICINE | Facility: CLINIC | Age: 83
End: 2023-12-22
Payer: MEDICARE

## 2023-12-22 DIAGNOSIS — U07.1 COVID-19: ICD-10-CM

## 2023-12-22 PROCEDURE — 99442: CPT | Mod: 95

## 2023-12-22 NOTE — ASSESSMENT
[FreeTextEntry1] : CYNDY FITZGERALD is a 83 year male who presents today Dec 22, 2023 via telephonic for +covid testing today.   COVID - will treat with Lagevrio 800mg BID for 5 days.  - 5 day isolation from first day of symptoms and wear mask for total of 10 days - If you develop any chest discomfort or shortness of breath go to ER.

## 2023-12-22 NOTE — HISTORY OF PRESENT ILLNESS
[Home] : at home, [unfilled] , at the time of the visit. [Medical Office: ([facility])___] : at the medical office located in  [Verbal consent obtained from patient] : the patient, [unfilled] [FreeTextEntry8] : CYNDY FITZGERALD is a 83 year male who presents today Dec 22, 2023 via telephonic for +covid testing today. His symptoms started yesterday.  +runny nose, +cough, +low grade fever. Taking tyneol. No wheezing, shortness of breath, chest pain.

## 2023-12-22 NOTE — REVIEW OF SYSTEMS
[Fever] : fever [Chills] : chills [Fatigue] : fatigue [Postnasal Drip] : postnasal drip [Chest Pain] : no chest pain [Palpitations] : no palpitations [Shortness Of Breath] : no shortness of breath [Wheezing] : no wheezing [Cough] : cough [Dyspnea on Exertion] : not dyspnea on exertion

## 2024-01-11 ENCOUNTER — APPOINTMENT (OUTPATIENT)
Dept: ORTHOPEDIC SURGERY | Facility: CLINIC | Age: 84
End: 2024-01-11

## 2024-06-08 ENCOUNTER — APPOINTMENT (OUTPATIENT)
Dept: FAMILY MEDICINE | Facility: CLINIC | Age: 84
End: 2024-06-08
Payer: MEDICARE

## 2024-06-08 ENCOUNTER — NON-APPOINTMENT (OUTPATIENT)
Age: 84
End: 2024-06-08

## 2024-06-08 VITALS
TEMPERATURE: 97.7 F | WEIGHT: 160 LBS | HEART RATE: 77 BPM | OXYGEN SATURATION: 98 % | HEIGHT: 65 IN | RESPIRATION RATE: 14 BRPM | DIASTOLIC BLOOD PRESSURE: 62 MMHG | SYSTOLIC BLOOD PRESSURE: 114 MMHG | BODY MASS INDEX: 26.66 KG/M2

## 2024-06-08 DIAGNOSIS — K21.9 GASTRO-ESOPHAGEAL REFLUX DISEASE W/OUT ESOPHAGITIS: ICD-10-CM

## 2024-06-08 DIAGNOSIS — F32.A ANXIETY DISORDER, UNSPECIFIED: ICD-10-CM

## 2024-06-08 DIAGNOSIS — F41.9 ANXIETY DISORDER, UNSPECIFIED: ICD-10-CM

## 2024-06-08 PROCEDURE — G2211 COMPLEX E/M VISIT ADD ON: CPT

## 2024-06-08 PROCEDURE — 99214 OFFICE O/P EST MOD 30 MIN: CPT

## 2024-06-08 RX ORDER — MOLNUPIRAVIR 200 MG/1
200 CAPSULE ORAL
Qty: 40 | Refills: 0 | Status: DISCONTINUED | COMMUNITY
Start: 2023-12-22 | End: 2024-06-08

## 2024-06-08 RX ORDER — OMEPRAZOLE 40 MG/1
40 CAPSULE, DELAYED RELEASE ORAL
Qty: 180 | Refills: 1 | Status: ACTIVE | COMMUNITY
Start: 2017-07-13 | End: 1900-01-01

## 2024-06-08 NOTE — HISTORY OF PRESENT ILLNESS
[FreeTextEntry1] : Patient presents for medication refills  [de-identified] : Patient presents for medication refills and labs.  Denies any symptoms.  Has hx of esophageal cancer and uses omeprazole 40mg BID.

## 2024-06-08 NOTE — PLAN
[FreeTextEntry1] : Patient presents for medication refill and lab work.  Ordered routine labs (CBC, CMP, TSH, Lipids, UA)  #esophageal cancer #GERD C/w Omeprazole 40mg BID  #CKD stage 3b Cr of 1.67 last year. Will repeat level  #depression C/w Fluvoxamine 25mg qd and Seroquel 50mg qhs

## 2024-06-08 NOTE — PHYSICAL EXAM
[No Acute Distress] : no acute distress [Well Nourished] : well nourished [Well Developed] : well developed [Well-Appearing] : well-appearing [Normal Sclera/Conjunctiva] : normal sclera/conjunctiva [Normal Outer Ear/Nose] : the outer ears and nose were normal in appearance [No JVD] : no jugular venous distention [No Respiratory Distress] : no respiratory distress  [Normal Rate] : normal rate  [No Accessory Muscle Use] : no accessory muscle use [Clear to Auscultation] : lungs were clear to auscultation bilaterally [Regular Rhythm] : with a regular rhythm [Normal S1, S2] : normal S1 and S2 [No Edema] : there was no peripheral edema [Normal Gait] : normal gait [Normal Affect] : the affect was normal [Normal Insight/Judgement] : insight and judgment were intact [de-identified] : systolic murmur

## 2024-06-12 ENCOUNTER — APPOINTMENT (OUTPATIENT)
Dept: FAMILY MEDICINE | Facility: CLINIC | Age: 84
End: 2024-06-12
Payer: MEDICARE

## 2024-06-12 DIAGNOSIS — R73.01 IMPAIRED FASTING GLUCOSE: ICD-10-CM

## 2024-06-12 DIAGNOSIS — N18.32 CHRONIC KIDNEY DISEASE, STAGE 3B: ICD-10-CM

## 2024-06-12 LAB
ALBUMIN SERPL ELPH-MCNC: 4.3 G/DL
ALP BLD-CCNC: 116 U/L
ALT SERPL-CCNC: 5 U/L
ANION GAP SERPL CALC-SCNC: 12 MMOL/L
APPEARANCE: CLEAR
AST SERPL-CCNC: 14 U/L
BASOPHILS # BLD AUTO: 0.04 K/UL
BASOPHILS NFR BLD AUTO: 1.2 %
BILIRUB SERPL-MCNC: 0.2 MG/DL
BILIRUBIN URINE: NEGATIVE
BLOOD URINE: NEGATIVE
BUN SERPL-MCNC: 29 MG/DL
CALCIUM SERPL-MCNC: 8.9 MG/DL
CHLORIDE SERPL-SCNC: 109 MMOL/L
CHOLEST SERPL-MCNC: 192 MG/DL
CO2 SERPL-SCNC: 19 MMOL/L
COLOR: YELLOW
CREAT SERPL-MCNC: 2.27 MG/DL
EGFR: 27 ML/MIN/1.73M2
EOSINOPHIL # BLD AUTO: 0.09 K/UL
EOSINOPHIL NFR BLD AUTO: 2.6 %
GLUCOSE QUALITATIVE U: NEGATIVE MG/DL
GLUCOSE SERPL-MCNC: 126 MG/DL
HCT VFR BLD CALC: 31 %
HDLC SERPL-MCNC: 51 MG/DL
HGB BLD-MCNC: 9.4 G/DL
IMM GRANULOCYTES NFR BLD AUTO: 0.3 %
KETONES URINE: NEGATIVE MG/DL
LDLC SERPL CALC-MCNC: 118 MG/DL
LEUKOCYTE ESTERASE URINE: NEGATIVE
LYMPHOCYTES # BLD AUTO: 0.69 K/UL
LYMPHOCYTES NFR BLD AUTO: 20.2 %
MAN DIFF?: NORMAL
MCHC RBC-ENTMCNC: 26.3 PG
MCHC RBC-ENTMCNC: 30.3 GM/DL
MCV RBC AUTO: 86.6 FL
MONOCYTES # BLD AUTO: 0.41 K/UL
MONOCYTES NFR BLD AUTO: 12 %
NEUTROPHILS # BLD AUTO: 2.18 K/UL
NEUTROPHILS NFR BLD AUTO: 63.7 %
NITRITE URINE: NEGATIVE
NONHDLC SERPL-MCNC: 141 MG/DL
PH URINE: 5.5
PLATELET # BLD AUTO: 179 K/UL
POTASSIUM SERPL-SCNC: 4.5 MMOL/L
PROT SERPL-MCNC: 6.9 G/DL
PROTEIN URINE: NORMAL MG/DL
RBC # BLD: 3.58 M/UL
RBC # FLD: 14.5 %
SODIUM SERPL-SCNC: 139 MMOL/L
SPECIFIC GRAVITY URINE: 1.01
TRIGL SERPL-MCNC: 126 MG/DL
TSH SERPL-ACNC: 3.36 UIU/ML
UROBILINOGEN URINE: 0.2 MG/DL
WBC # FLD AUTO: 3.42 K/UL

## 2024-06-12 PROCEDURE — 99441: CPT | Mod: 93

## 2024-06-12 NOTE — HISTORY OF PRESENT ILLNESS
[Home] : at home, [unfilled] , at the time of the visit. [Medical Office: ([facility])___] : at the medical office located in  [Verbal consent obtained from patient] : the patient, [unfilled] [FreeTextEntry1] : Lab work discussion [de-identified] : Verbal consent given on 06/12/2024 at 08:57 by CYNDY FITZGERALD  Patient presents to discuss lab work.  Had high Cr of 2.27 and high sugars.  Also with low Hb and WBC.  Drinks around 40oz of water a day.  Eats a lot of sweets

## 2024-06-12 NOTE — PLAN
[FreeTextEntry1] : Patient here for f/u on blood work. Notes a progression of CKD.  Also has high sugars.  Low Hb is likely 2/2 CKD.  Low WBC's seem to be chronic.   Plan: Will repeat BMP after 2 weeks of proper hydration. Will do A1c testing.  Advised cutting down on sugars. Will refer to nephrology if no improvement kidney function.

## 2024-07-08 ENCOUNTER — APPOINTMENT (OUTPATIENT)
Dept: FAMILY MEDICINE | Facility: CLINIC | Age: 84
End: 2024-07-08
Payer: MEDICARE

## 2024-07-08 VITALS
TEMPERATURE: 97.6 F | BODY MASS INDEX: 25.66 KG/M2 | HEART RATE: 70 BPM | SYSTOLIC BLOOD PRESSURE: 116 MMHG | HEIGHT: 65 IN | WEIGHT: 154 LBS | RESPIRATION RATE: 14 BRPM | OXYGEN SATURATION: 98 % | DIASTOLIC BLOOD PRESSURE: 70 MMHG

## 2024-07-08 DIAGNOSIS — D72.819 DECREASED WHITE BLOOD CELL COUNT, UNSPECIFIED: ICD-10-CM

## 2024-07-08 DIAGNOSIS — R79.89 OTHER SPECIFIED ABNORMAL FINDINGS OF BLOOD CHEMISTRY: ICD-10-CM

## 2024-07-08 PROCEDURE — 99214 OFFICE O/P EST MOD 30 MIN: CPT

## 2024-07-08 PROCEDURE — G2211 COMPLEX E/M VISIT ADD ON: CPT

## 2024-07-09 LAB
ANION GAP SERPL CALC-SCNC: 17 MMOL/L
BASOPHILS # BLD AUTO: 0.05 K/UL
BASOPHILS NFR BLD AUTO: 1.2 %
BUN SERPL-MCNC: 30 MG/DL
CALCIUM SERPL-MCNC: 9.1 MG/DL
CHLORIDE SERPL-SCNC: 107 MMOL/L
CO2 SERPL-SCNC: 17 MMOL/L
CREAT SERPL-MCNC: 2.01 MG/DL
EGFR: 32 ML/MIN/1.73M2
EOSINOPHIL # BLD AUTO: 0.1 K/UL
EOSINOPHIL NFR BLD AUTO: 2.4 %
ESTIMATED AVERAGE GLUCOSE: 131 MG/DL
GLUCOSE SERPL-MCNC: 116 MG/DL
HBA1C MFR BLD HPLC: 6.2 %
HCT VFR BLD CALC: 33 %
HGB BLD-MCNC: 10.3 G/DL
IMM GRANULOCYTES NFR BLD AUTO: 0.5 %
LYMPHOCYTES # BLD AUTO: 0.79 K/UL
LYMPHOCYTES NFR BLD AUTO: 19.2 %
MAN DIFF?: NORMAL
MCHC RBC-ENTMCNC: 27.5 PG
MCHC RBC-ENTMCNC: 31.2 GM/DL
MCV RBC AUTO: 88 FL
MONOCYTES # BLD AUTO: 0.36 K/UL
MONOCYTES NFR BLD AUTO: 8.8 %
NEUTROPHILS # BLD AUTO: 2.79 K/UL
NEUTROPHILS NFR BLD AUTO: 67.9 %
PLATELET # BLD AUTO: 188 K/UL
POTASSIUM SERPL-SCNC: 4.5 MMOL/L
RBC # BLD: 3.75 M/UL
RBC # FLD: 14.7 %
SODIUM SERPL-SCNC: 141 MMOL/L
WBC # FLD AUTO: 4.11 K/UL

## 2024-10-07 ENCOUNTER — APPOINTMENT (OUTPATIENT)
Dept: FAMILY MEDICINE | Facility: CLINIC | Age: 84
End: 2024-10-07
Payer: MEDICARE

## 2024-10-07 VITALS
DIASTOLIC BLOOD PRESSURE: 62 MMHG | RESPIRATION RATE: 14 BRPM | SYSTOLIC BLOOD PRESSURE: 122 MMHG | OXYGEN SATURATION: 98 % | HEIGHT: 65 IN | HEART RATE: 72 BPM | TEMPERATURE: 98.2 F | BODY MASS INDEX: 26.33 KG/M2 | WEIGHT: 158 LBS

## 2024-10-07 DIAGNOSIS — R10.9 UNSPECIFIED ABDOMINAL PAIN: ICD-10-CM

## 2024-10-07 PROCEDURE — 99213 OFFICE O/P EST LOW 20 MIN: CPT

## 2024-10-07 PROCEDURE — G2211 COMPLEX E/M VISIT ADD ON: CPT

## 2025-05-20 ENCOUNTER — TRANSCRIPTION ENCOUNTER (OUTPATIENT)
Age: 85
End: 2025-05-20

## 2025-07-08 PROBLEM — Z87.898 HISTORY OF POSTNASAL DRIP: Status: RESOLVED | Noted: 2022-07-12 | Resolved: 2025-07-08

## 2025-07-08 PROBLEM — Z13.21 ENCOUNTER FOR VITAMIN DEFICIENCY SCREENING: Status: RESOLVED | Noted: 2019-05-02 | Resolved: 2025-07-08

## 2025-07-08 PROBLEM — U07.1 COVID-19: Status: RESOLVED | Noted: 2023-12-22 | Resolved: 2025-07-08

## 2025-07-08 PROBLEM — Z11.59 SCREENING FOR VIRAL DISEASE: Status: RESOLVED | Noted: 2020-07-20 | Resolved: 2025-07-08

## 2025-07-08 PROBLEM — Z11.2 SCREENING EXAMINATION FOR BACTERIAL AND SPIROCHETAL DISEASE: Status: RESOLVED | Noted: 2023-05-02 | Resolved: 2025-07-08

## 2025-07-08 PROBLEM — Z87.898 HISTORY OF FEVER: Status: RESOLVED | Noted: 2019-05-16 | Resolved: 2025-07-08

## 2025-07-08 PROBLEM — J06.9 URI WITH COUGH AND CONGESTION: Status: RESOLVED | Noted: 2017-10-07 | Resolved: 2025-07-08

## 2025-07-24 ENCOUNTER — NON-APPOINTMENT (OUTPATIENT)
Age: 85
End: 2025-07-24

## 2025-07-24 ENCOUNTER — APPOINTMENT (OUTPATIENT)
Dept: FAMILY MEDICINE | Facility: CLINIC | Age: 85
End: 2025-07-24
Payer: MEDICARE

## 2025-07-24 VITALS
HEART RATE: 70 BPM | WEIGHT: 154 LBS | HEIGHT: 65 IN | BODY MASS INDEX: 25.66 KG/M2 | DIASTOLIC BLOOD PRESSURE: 58 MMHG | RESPIRATION RATE: 14 BRPM | OXYGEN SATURATION: 97 % | SYSTOLIC BLOOD PRESSURE: 110 MMHG

## 2025-07-24 DIAGNOSIS — Z00.00 ENCOUNTER FOR GENERAL ADULT MEDICAL EXAMINATION W/OUT ABNORMAL FINDINGS: ICD-10-CM

## 2025-07-24 DIAGNOSIS — K21.9 GASTRO-ESOPHAGEAL REFLUX DISEASE W/OUT ESOPHAGITIS: ICD-10-CM

## 2025-07-24 DIAGNOSIS — Z11.59 ENCOUNTER FOR SCREENING FOR OTHER VIRAL DISEASES: ICD-10-CM

## 2025-07-24 PROCEDURE — 36415 COLL VENOUS BLD VENIPUNCTURE: CPT

## 2025-07-24 PROCEDURE — G0439: CPT

## 2025-07-30 ENCOUNTER — NON-APPOINTMENT (OUTPATIENT)
Age: 85
End: 2025-07-30

## 2025-07-30 LAB
ALBUMIN SERPL ELPH-MCNC: 4.3 G/DL
ALP BLD-CCNC: 115 U/L
ALT SERPL-CCNC: 10 U/L
ANION GAP SERPL CALC-SCNC: 11 MMOL/L
APPEARANCE: CLEAR
AST SERPL-CCNC: 18 U/L
BILIRUB SERPL-MCNC: 0.2 MG/DL
BILIRUBIN URINE: NEGATIVE
BLOOD URINE: NEGATIVE
BUN SERPL-MCNC: 50 MG/DL
CALCIUM SERPL-MCNC: 9.6 MG/DL
CHLORIDE SERPL-SCNC: 109 MMOL/L
CHOLEST SERPL-MCNC: 194 MG/DL
CO2 SERPL-SCNC: 21 MMOL/L
COLOR: YELLOW
CREAT SERPL-MCNC: 3.03 MG/DL
EGFRCR SERPLBLD CKD-EPI 2021: 19 ML/MIN/1.73M2
GLUCOSE QUALITATIVE U: NEGATIVE MG/DL
GLUCOSE SERPL-MCNC: 107 MG/DL
HCT VFR BLD CALC: 29 %
HDLC SERPL-MCNC: 50 MG/DL
HGB BLD-MCNC: 9.3 G/DL
KETONES URINE: NEGATIVE MG/DL
LDLC SERPL-MCNC: 125 MG/DL
LEUKOCYTE ESTERASE URINE: NEGATIVE
MCHC RBC-ENTMCNC: 28.5 PG
MCHC RBC-ENTMCNC: 32.1 G/DL
MCV RBC AUTO: 89 FL
MEV IGG FLD QL IA: >300 AU/ML
MEV IGG+IGM SER-IMP: POSITIVE
MUV AB SER-ACNC: POSITIVE
MUV IGG SER QL IA: 37.2 AU/ML
NITRITE URINE: NEGATIVE
NONHDLC SERPL-MCNC: 145 MG/DL
PH URINE: 5.5
PLATELET # BLD AUTO: 145 K/UL
POTASSIUM SERPL-SCNC: 5.8 MMOL/L
PROT SERPL-MCNC: 7 G/DL
PROTEIN URINE: NORMAL MG/DL
RBC # BLD: 3.26 M/UL
RBC # FLD: 13.7 %
RUBV IGG FLD-ACNC: 13.7 INDEX
RUBV IGG SER-IMP: POSITIVE
SODIUM SERPL-SCNC: 141 MMOL/L
SPECIFIC GRAVITY URINE: 1.01
TRIGL SERPL-MCNC: 113 MG/DL
TSH SERPL-ACNC: 2.58 UIU/ML
UROBILINOGEN URINE: 0.2 MG/DL
WBC # FLD AUTO: 4.18 K/UL

## 2025-08-25 ENCOUNTER — NON-APPOINTMENT (OUTPATIENT)
Age: 85
End: 2025-08-25

## 2025-08-25 ENCOUNTER — APPOINTMENT (OUTPATIENT)
Dept: FAMILY MEDICINE | Facility: CLINIC | Age: 85
End: 2025-08-25
Payer: MEDICARE

## 2025-08-25 DIAGNOSIS — R69 ILLNESS, UNSPECIFIED: ICD-10-CM

## 2025-08-25 DIAGNOSIS — N18.32 CHRONIC KIDNEY DISEASE, STAGE 3B: ICD-10-CM

## 2025-08-25 PROCEDURE — 99212 OFFICE O/P EST SF 10 MIN: CPT | Mod: 93
